# Patient Record
Sex: MALE | Race: WHITE | NOT HISPANIC OR LATINO | Employment: OTHER | URBAN - METROPOLITAN AREA
[De-identification: names, ages, dates, MRNs, and addresses within clinical notes are randomized per-mention and may not be internally consistent; named-entity substitution may affect disease eponyms.]

---

## 2021-03-02 ENCOUNTER — OFFICE VISIT (OUTPATIENT)
Dept: DERMATOLOGY | Facility: IMAGING CENTER | Age: 61
End: 2021-03-02
Payer: COMMERCIAL

## 2021-03-02 VITALS — HEIGHT: 69 IN | TEMPERATURE: 97.4 F | WEIGHT: 202 LBS | BODY MASS INDEX: 29.92 KG/M2

## 2021-03-02 DIAGNOSIS — D18.01 CHERRY ANGIOMA: ICD-10-CM

## 2021-03-02 DIAGNOSIS — L57.0 ACTINIC KERATOSES: ICD-10-CM

## 2021-03-02 DIAGNOSIS — D22.9 MULTIPLE MELANOCYTIC NEVI: ICD-10-CM

## 2021-03-02 DIAGNOSIS — L81.4 LENTIGINES: ICD-10-CM

## 2021-03-02 DIAGNOSIS — L82.1 SEBORRHEIC KERATOSES: ICD-10-CM

## 2021-03-02 PROCEDURE — 17003 DESTRUCT PREMALG LES 2-14: CPT | Performed by: DERMATOLOGY

## 2021-03-02 PROCEDURE — 17000 DESTRUCT PREMALG LESION: CPT | Performed by: DERMATOLOGY

## 2021-03-02 PROCEDURE — 99203 OFFICE O/P NEW LOW 30 MIN: CPT | Mod: 25 | Performed by: DERMATOLOGY

## 2021-03-02 NOTE — PROGRESS NOTES
DERMATOLOGY NOTE  NEW VISIT       Chief complaint: Establish Care (CAMRYN)     Establish care for full skin exam.  No new or changing lesions of concern. History of sun exposure - played golf in college and rode motorcycle for years. Denies new, growing, changing, itching or bleeding skin lesions today.    History of skin cancer: No  History of precancers/actinic keratoses: No  History of biopsies:Yes, Details: benign  History of blistering/severe sunburns:No  Family history of skin cancer:No  Family history of atypical moles:No    Past Medical History:   Diagnosis Date   • Prostate cancer (HCC)         History reviewed. No pertinent family history.     Social History     Socioeconomic History   • Marital status:      Spouse name: Not on file   • Number of children: Not on file   • Years of education: Not on file   • Highest education level: Not on file   Occupational History   • Not on file   Tobacco Use   • Smoking status: Never Smoker   • Smokeless tobacco: Never Used   Substance and Sexual Activity   • Alcohol use: Not on file   • Drug use: Not on file   • Sexual activity: Not on file   Other Topics Concern   • Not on file   Social History Narrative   • Not on file     Social Determinants of Health     Financial Resource Strain:    • Difficulty of Paying Living Expenses:    Food Insecurity:    • Worried About Running Out of Food in the Last Year:    • Ran Out of Food in the Last Year:    Transportation Needs:    • Lack of Transportation (Medical):    • Lack of Transportation (Non-Medical):    Physical Activity:    • Days of Exercise per Week:    • Minutes of Exercise per Session:    Stress:    • Feeling of Stress :    Social Connections:    • Frequency of Communication with Friends and Family:    • Frequency of Social Gatherings with Friends and Family:    • Attends Yazidi Services:    • Active Member of Clubs or Organizations:    • Attends Club or Organization Meetings:    • Marital Status:    Intimate  "Partner Violence:    • Fear of Current or Ex-Partner:    • Emotionally Abused:    • Physically Abused:    • Sexually Abused:         No Known Allergies     MEDICATIONS:  Medications relevant to specialty reviewed.  No current outpatient medications on file.     REVIEW OF SYSTEMS:   Positive for skin (see HPI)  Negative for fevers, chills and cough       EXAM:  Temp 36.3 °C (97.4 °F)   Ht 1.753 m (5' 9\")   Wt 91.6 kg (202 lb)   BMI 29.83 kg/m²   Constitutional: Well-developed, well-nourished, and in no distress.     A total body skin exam was performed excluding the genitals per patient preference and including the following areas: head (including face), neck, chest, abdomen, groin/buttocks, back, bilateral upper extremities, and bilateral lower extremities with the following pertinent findings listed below and/or in assessment/plan.   Exam in plan     IMPRESSION / PLAN:    1. Actinic keratoses  Exam: left temple, left helix and right cheek with pink gritty papules  - Discussed precancerous nature of the condition, relationship to ultraviolet light exposure and need for safe sun practices and daily broad spectrum sun protection.   - Discussed treatment options including topical therapies, cryotherapy and photodynamic therapy.   - After discussing treatment options, patient wishes to pursue cryotherapy. Patient was advised that if actinic keratoses persist at one month after treatment, should come in for further evaluation and possible biopsy.     Procedure Note: Informed verbal consent including risk of redness, swelling, pain, blistering, scar, bleeding, infection and need for possible further treatment was obtained. The location(s) was/were identified by the patient and the physician. The lesion(s) was/were treated with cryotherapy for a total of 1 freeze/thaw cycles with the open spray technique. The patient tolerated the procedure well.  Wound care was discussed.   Location: as noted in exam  Total # of " "lesions treated:  3    2. Multiple melanocytic nevi  Exam: trunk and extremities with scattered medium brown uniform macules all of which were morphologically similar and with benign-appearing pigment network patterns on dermoscopy   -Reviewed moles and melanoma. Patient advised to return sooner than scheduled if concerning changes in moles are noted.  -Reviewed sun protective behavior including sunscreen application and reapplication, appropriate choice of SPF, hat, protective clothing, seeking shade and never choosing to \"lie out\" in the sun.    3. Lentigines  Exam: sun exposed skin of trunk and b/l upper and lower extremities with scattered clinically benign light brown reticulated macules all of which were morphologically similar and none of which were suspicious for skin cancer today   - Discussed benign nature of lesions, related to sun exposure  - Discussed sun protection    4. Seborrheic keratoses  Exam: neck, trunk and extremities with scattered tan-brown, waxy, hyperkeratotic papules   -nature of the diagnosis was discussed in detail  -clinically benign today on exam, reassurance was given  -continue to monitor for any changes, pt to call if any changes occur    5. Cherry angioma  Exam: trunk and extremities with cherry red macules and papules  -nature of the diagnosis was discussed in detail  -clinically benign today on exam, reassurance was given  -continue to monitor for any changes, pt to call if any changes occur    Return to clinic in: Return in about 1 year (around 3/2/2022) for CAMRYN. and as needed for any new or changing skin lesions.    Keily Duenas M.D.    "

## 2021-03-15 DIAGNOSIS — Z23 NEED FOR VACCINATION: ICD-10-CM

## 2021-03-19 ENCOUNTER — IMMUNIZATION (OUTPATIENT)
Dept: FAMILY PLANNING/WOMEN'S HEALTH CLINIC | Facility: IMMUNIZATION CENTER | Age: 61
End: 2021-03-19
Attending: INTERNAL MEDICINE
Payer: COMMERCIAL

## 2021-03-19 DIAGNOSIS — Z23 NEED FOR VACCINATION: ICD-10-CM

## 2021-03-19 DIAGNOSIS — Z23 ENCOUNTER FOR VACCINATION: Primary | ICD-10-CM

## 2021-03-19 PROCEDURE — 91301 MODERNA SARS-COV-2 VACCINE: CPT

## 2021-03-19 PROCEDURE — 0011A MODERNA SARS-COV-2 VACCINE: CPT

## 2021-03-23 ENCOUNTER — OFFICE VISIT (OUTPATIENT)
Dept: MEDICAL GROUP | Facility: IMAGING CENTER | Age: 61
End: 2021-03-23
Payer: COMMERCIAL

## 2021-03-23 VITALS
WEIGHT: 213 LBS | BODY MASS INDEX: 31.55 KG/M2 | RESPIRATION RATE: 12 BRPM | HEART RATE: 52 BPM | DIASTOLIC BLOOD PRESSURE: 68 MMHG | HEIGHT: 69 IN | OXYGEN SATURATION: 96 % | SYSTOLIC BLOOD PRESSURE: 120 MMHG | TEMPERATURE: 97.9 F

## 2021-03-23 DIAGNOSIS — M54.41 ACUTE BILATERAL LOW BACK PAIN WITH RIGHT-SIDED SCIATICA: ICD-10-CM

## 2021-03-23 DIAGNOSIS — Z85.46 HISTORY OF PROSTATE CANCER: ICD-10-CM

## 2021-03-23 DIAGNOSIS — N52.31 ERECTILE DYSFUNCTION AFTER RADICAL PROSTATECTOMY: ICD-10-CM

## 2021-03-23 DIAGNOSIS — Z13.0 SCREENING FOR DEFICIENCY ANEMIA: ICD-10-CM

## 2021-03-23 DIAGNOSIS — Z11.59 ENCOUNTER FOR HEPATITIS C SCREENING TEST FOR LOW RISK PATIENT: ICD-10-CM

## 2021-03-23 DIAGNOSIS — Z13.1 SCREENING FOR DIABETES MELLITUS: ICD-10-CM

## 2021-03-23 DIAGNOSIS — Z13.220 SCREENING FOR CHOLESTEROL LEVEL: ICD-10-CM

## 2021-03-23 DIAGNOSIS — B00.2 ORAL HERPES: ICD-10-CM

## 2021-03-23 DIAGNOSIS — Z12.11 SCREENING FOR COLON CANCER: ICD-10-CM

## 2021-03-23 DIAGNOSIS — L24.0 IRRITANT CONTACT DERMATITIS DUE TO DETERGENT: ICD-10-CM

## 2021-03-23 PROBLEM — N52.39 POST-PROCEDURAL ERECTILE DYSFUNCTION: Status: ACTIVE | Noted: 2021-03-23

## 2021-03-23 PROCEDURE — 99204 OFFICE O/P NEW MOD 45 MIN: CPT | Performed by: FAMILY MEDICINE

## 2021-03-23 RX ORDER — TRIAMCINOLONE ACETONIDE 1 MG/G
1 CREAM TOPICAL 2 TIMES DAILY
Qty: 28.4 G | Refills: 3 | Status: SHIPPED | OUTPATIENT
Start: 2021-03-23 | End: 2022-08-15 | Stop reason: SDUPTHER

## 2021-03-23 RX ORDER — SILDENAFIL CITRATE 20 MG/1
TABLET ORAL
COMMUNITY
Start: 2019-12-19 | End: 2021-03-23 | Stop reason: SDUPTHER

## 2021-03-23 RX ORDER — ACYCLOVIR 400 MG/1
TABLET ORAL
COMMUNITY
Start: 2019-03-08 | End: 2021-03-23 | Stop reason: SDUPTHER

## 2021-03-23 RX ORDER — ACYCLOVIR 400 MG/1
TABLET ORAL
Qty: 45 TABLET | Refills: 1 | Status: SHIPPED | OUTPATIENT
Start: 2021-03-23 | End: 2022-08-15 | Stop reason: SDUPTHER

## 2021-03-23 RX ORDER — SILDENAFIL CITRATE 20 MG/1
TABLET ORAL
Qty: 20 TABLET | Refills: 3 | Status: SHIPPED | OUTPATIENT
Start: 2021-03-23 | End: 2022-08-15 | Stop reason: SDUPTHER

## 2021-03-23 RX ORDER — TRIAMCINOLONE ACETONIDE 1 MG/G
CREAM TOPICAL 2 TIMES DAILY
COMMUNITY
End: 2021-03-23 | Stop reason: SDUPTHER

## 2021-03-23 ASSESSMENT — ENCOUNTER SYMPTOMS
NAUSEA: 0
COUGH: 0
ABDOMINAL PAIN: 0
DOUBLE VISION: 0
HEADACHES: 0
WHEEZING: 0
DIZZINESS: 0
FEVER: 0
PALPITATIONS: 0
BACK PAIN: 1
SHORTNESS OF BREATH: 0
CHILLS: 0
DIARRHEA: 0
VOMITING: 0
EYE PAIN: 0
MYALGIAS: 0

## 2021-03-23 ASSESSMENT — PAIN SCALES - GENERAL: PAINLEVEL: NO PAIN

## 2021-03-23 ASSESSMENT — PATIENT HEALTH QUESTIONNAIRE - PHQ9: CLINICAL INTERPRETATION OF PHQ2 SCORE: 0

## 2021-03-23 NOTE — PROGRESS NOTES
"Chief Complaint   Patient presents with   • Establish Care   • Back Pain     low back, into right leg, x 3 months     60-year-old male with a history of prostate cancer here with low back pain. Play golf and has been playing palliatively. Never had back pain before. Stretching and when he doesn't play golf it proves mostly resolved.More on the right low back does go down the right leg though does get the left side as well.   No fevers, chills, no weight loss, no numbness in rectal area, no bowel or urinary incontinence.   Allergy to oil of palm. So uses kenalog cream. Though not often.  Uses sildenafil for ED.  Allergies   Allergen Reactions   • Other Misc Rash     Allergic to oil of palm     Current Outpatient Medications   Medication Sig Dispense Refill   • sildenafil (REVATIO) 20 MG tablet Take one to three tabs orally one hour prior to sexual activity. Do not exceed 3 tabs in 24 hours. 20 tablet 3   • acyclovir (ZOVIRAX) 400 MG tablet Take 1 tablet by mouth 3 times a day for 5 days 45 tablet 1   • triamcinolone acetonide (KENALOG) 0.1 % Cream Apply 1 Application topically 2 times a day. 28.4 g 3     No current facility-administered medications for this visit.     Social History     Tobacco Use   • Smoking status: Never Smoker   • Smokeless tobacco: Never Used   Substance Use Topics   • Alcohol use: Not Currently   • Drug use: Not Currently     History reviewed. No pertinent family history.    /68   Pulse (!) 52   Temp 36.6 °C (97.9 °F)   Resp 12   Ht 1.753 m (5' 9\")   Wt 96.6 kg (213 lb)   SpO2 96%  Body mass index is 31.45 kg/m².  Review of Systems   Constitutional: Negative for chills, fever and malaise/fatigue.   HENT: Negative for hearing loss and tinnitus.    Eyes: Negative for double vision and pain.   Respiratory: Negative for cough, shortness of breath and wheezing.    Cardiovascular: Negative for chest pain, palpitations and leg swelling.   Gastrointestinal: Negative for abdominal pain, " diarrhea, nausea and vomiting.   Genitourinary: Negative for dysuria and frequency.   Musculoskeletal: Positive for back pain. Negative for joint pain and myalgias.   Skin: Negative for itching and rash.   Neurological: Negative for dizziness and headaches.     Physical Exam   Constitutional: He is well-developed, well-nourished, and in no distress. No distress.   HENT:   Head: Normocephalic and atraumatic.   Eyes: Pupils are equal, round, and reactive to light. Conjunctivae and EOM are normal. Right eye exhibits no discharge. Left eye exhibits no discharge. No scleral icterus.   Neck: No thyromegaly present.   Pulmonary/Chest: Effort normal. No respiratory distress.   Abdominal: He exhibits no distension.   Musculoskeletal:         General: No edema.      Comments: No spinal tenderness no point tenderness of the muscles.  Full range of motion of the spine.    Neurological: He is alert.   Skin: Skin is warm and dry. He is not diaphoretic.   Psychiatric: Affect and judgment normal.         All labs (last 1 month):   No visits with results within 1 Month(s) from this visit.   Latest known visit with results is:   No results found for any previous visit.       Lipids: No results found for: CHOLSTRLTOT, TRIGLYCERIDE, HDL, LDL    Imaging: No results found.    A/P    Return for annual.    Problem List Items Addressed This Visit     Post-procedural erectile dysfunction    Relevant Medications    sildenafil (REVATIO) 20 MG tablet      Other Visit Diagnoses     History of prostate cancer        Relevant Orders    DX-LUMBAR SPINE-2 OR 3 VIEWS    Screening for colon cancer        Relevant Orders    REFERRAL TO GI FOR COLONOSCOPY    Screening for deficiency anemia        Relevant Orders    CBC WITH DIFFERENTIAL    Screening for diabetes mellitus        Relevant Orders    Comp Metabolic Panel    Screening for cholesterol level        Relevant Orders    Lipid Profile    Encounter for hepatitis C screening test for low risk  patient        Relevant Orders    HEP C VIRUS ANTIBODY    Oral herpes        Relevant Medications    acyclovir (ZOVIRAX) 400 MG tablet    Irritant contact dermatitis due to detergent        Relevant Medications    triamcinolone acetonide (KENALOG) 0.1 % Cream    Acute bilateral low back pain with right-sided sciatica        Relevant Orders    REFERRAL TO PHYSICAL THERAPY    DX-LUMBAR SPINE-2 OR 3 VIEWS          Portions of this note may be dictated using Dragon NaturallySpeaRiskonnect voice recognition software.  Variances in spelling and vocabulary are possible and unintentional.  Not all areas may be caught/corrected.  Please notify me if any discrepancies are noted or if the meaning of any statement is not correct/clear.

## 2021-04-06 ENCOUNTER — HOSPITAL ENCOUNTER (OUTPATIENT)
Dept: RADIOLOGY | Facility: MEDICAL CENTER | Age: 61
End: 2021-04-06
Attending: FAMILY MEDICINE
Payer: COMMERCIAL

## 2021-04-06 DIAGNOSIS — Z85.46 HISTORY OF PROSTATE CANCER: ICD-10-CM

## 2021-04-06 DIAGNOSIS — M54.41 ACUTE BILATERAL LOW BACK PAIN WITH RIGHT-SIDED SCIATICA: ICD-10-CM

## 2021-04-06 PROCEDURE — 72100 X-RAY EXAM L-S SPINE 2/3 VWS: CPT

## 2021-04-09 ENCOUNTER — HOSPITAL ENCOUNTER (OUTPATIENT)
Dept: LAB | Facility: MEDICAL CENTER | Age: 61
End: 2021-04-09
Attending: FAMILY MEDICINE
Payer: COMMERCIAL

## 2021-04-09 DIAGNOSIS — Z11.59 ENCOUNTER FOR HEPATITIS C SCREENING TEST FOR LOW RISK PATIENT: ICD-10-CM

## 2021-04-09 DIAGNOSIS — Z13.220 SCREENING FOR CHOLESTEROL LEVEL: ICD-10-CM

## 2021-04-09 DIAGNOSIS — Z13.1 SCREENING FOR DIABETES MELLITUS: ICD-10-CM

## 2021-04-09 DIAGNOSIS — Z13.0 SCREENING FOR DEFICIENCY ANEMIA: ICD-10-CM

## 2021-04-09 LAB
ALBUMIN SERPL BCP-MCNC: 4.1 G/DL (ref 3.2–4.9)
ALBUMIN/GLOB SERPL: 1.4 G/DL
ALP SERPL-CCNC: 86 U/L (ref 30–99)
ALT SERPL-CCNC: 21 U/L (ref 2–50)
ANION GAP SERPL CALC-SCNC: 4 MMOL/L (ref 7–16)
AST SERPL-CCNC: 24 U/L (ref 12–45)
BASOPHILS # BLD AUTO: 0.5 % (ref 0–1.8)
BASOPHILS # BLD: 0.03 K/UL (ref 0–0.12)
BILIRUB SERPL-MCNC: 0.8 MG/DL (ref 0.1–1.5)
BUN SERPL-MCNC: 20 MG/DL (ref 8–22)
CALCIUM SERPL-MCNC: 9.4 MG/DL (ref 8.5–10.5)
CHLORIDE SERPL-SCNC: 106 MMOL/L (ref 96–112)
CHOLEST SERPL-MCNC: 189 MG/DL (ref 100–199)
CO2 SERPL-SCNC: 29 MMOL/L (ref 20–33)
CREAT SERPL-MCNC: 0.97 MG/DL (ref 0.5–1.4)
EOSINOPHIL # BLD AUTO: 0.11 K/UL (ref 0–0.51)
EOSINOPHIL NFR BLD: 1.9 % (ref 0–6.9)
ERYTHROCYTE [DISTWIDTH] IN BLOOD BY AUTOMATED COUNT: 45.6 FL (ref 35.9–50)
FASTING STATUS PATIENT QL REPORTED: NORMAL
GLOBULIN SER CALC-MCNC: 3 G/DL (ref 1.9–3.5)
GLUCOSE SERPL-MCNC: 100 MG/DL (ref 65–99)
HCT VFR BLD AUTO: 46.7 % (ref 42–52)
HCV AB SER QL: NORMAL
HDLC SERPL-MCNC: 40 MG/DL
HGB BLD-MCNC: 15.9 G/DL (ref 14–18)
IMM GRANULOCYTES # BLD AUTO: 0.01 K/UL (ref 0–0.11)
IMM GRANULOCYTES NFR BLD AUTO: 0.2 % (ref 0–0.9)
LDLC SERPL CALC-MCNC: 134 MG/DL
LYMPHOCYTES # BLD AUTO: 2.59 K/UL (ref 1–4.8)
LYMPHOCYTES NFR BLD: 43.7 % (ref 22–41)
MCH RBC QN AUTO: 32.3 PG (ref 27–33)
MCHC RBC AUTO-ENTMCNC: 34 G/DL (ref 33.7–35.3)
MCV RBC AUTO: 94.7 FL (ref 81.4–97.8)
MONOCYTES # BLD AUTO: 0.61 K/UL (ref 0–0.85)
MONOCYTES NFR BLD AUTO: 10.3 % (ref 0–13.4)
NEUTROPHILS # BLD AUTO: 2.58 K/UL (ref 1.82–7.42)
NEUTROPHILS NFR BLD: 43.4 % (ref 44–72)
NRBC # BLD AUTO: 0 K/UL
NRBC BLD-RTO: 0 /100 WBC
PLATELET # BLD AUTO: 272 K/UL (ref 164–446)
PMV BLD AUTO: 10.4 FL (ref 9–12.9)
POTASSIUM SERPL-SCNC: 4.8 MMOL/L (ref 3.6–5.5)
PROT SERPL-MCNC: 7.1 G/DL (ref 6–8.2)
RBC # BLD AUTO: 4.93 M/UL (ref 4.7–6.1)
SODIUM SERPL-SCNC: 139 MMOL/L (ref 135–145)
TRIGL SERPL-MCNC: 77 MG/DL (ref 0–149)
WBC # BLD AUTO: 5.9 K/UL (ref 4.8–10.8)

## 2021-04-09 PROCEDURE — 80053 COMPREHEN METABOLIC PANEL: CPT

## 2021-04-09 PROCEDURE — 36415 COLL VENOUS BLD VENIPUNCTURE: CPT

## 2021-04-09 PROCEDURE — 85025 COMPLETE CBC W/AUTO DIFF WBC: CPT

## 2021-04-09 PROCEDURE — 80061 LIPID PANEL: CPT

## 2021-04-09 PROCEDURE — 86803 HEPATITIS C AB TEST: CPT

## 2021-04-15 ENCOUNTER — PHYSICAL THERAPY (OUTPATIENT)
Dept: PHYSICAL THERAPY | Facility: MEDICAL CENTER | Age: 61
End: 2021-04-15
Attending: FAMILY MEDICINE
Payer: COMMERCIAL

## 2021-04-15 DIAGNOSIS — M54.41 ACUTE BILATERAL LOW BACK PAIN WITH RIGHT-SIDED SCIATICA: ICD-10-CM

## 2021-04-15 PROCEDURE — 97162 PT EVAL MOD COMPLEX 30 MIN: CPT

## 2021-04-15 PROCEDURE — 97110 THERAPEUTIC EXERCISES: CPT

## 2021-04-15 ASSESSMENT — ENCOUNTER SYMPTOMS
PAIN TIMING: OCCASIONAL
PAIN SCALE AT LOWEST: 0
PAIN SCALE: 0
PAIN SCALE AT HIGHEST: 6

## 2021-04-15 NOTE — OP THERAPY EVALUATION
"  Outpatient Physical Therapy  INITIAL EVALUATION    [facility] Outpatient Physical Therapy  [address]  [place] [address]  Phone:  [phone]  Fax:  [phone]    Date of Evaluation: 04/15/2021    Patient: Dipak Johnson  YOB: 1960  MRN: [id number]     Referring Provider: [place] [address] [address]   Referring Diagnosis Acute bilateral low back pain with right-sided sciatica [M54.41]     Time Calculation    Start time: 1311  Stop time: 1412 Time Calculation (min): 61 minutes         Chief Complaint: Back Problem    Visit Diagnoses     ICD-10-CM   1. Acute bilateral low back pain with right-sided sciatica  M54.41       No data found  Subjective:   History of Present Illness:     Mechanism of injury:  Patient is a 60 year old male with a PMH including: Appendectomy, carpal tunnel release, hernia repair, Prostate cancer. Surgical history including: Removal of prostate (9 years ago.  Pt reports still does [facility] for prostate cancer.    Pt presents to therapy with complaints of intermittent \"irritating\" R sided LBP with radiation into RLE which initiated 2 months ago after golfing. Pain extending to ankle. Denies numbness/tingling in LE's. Increase pain with valsalva. Denies previous history of back pain without injuries or trauma. Currently denies changes in bowel and bladder, saddle anesthesia, significant weight changes, chills/night sweats, nausea and vomiting, and unexplained fatigue.  Pt consents to evaluation and treatment today.            Prior level of function:  Professional golfer; retired   Pain:     Current pain ratin    At best pain ratin    At worst pain ratin    Location:  R sided LBP and occasional pain into leg to ankle    Pain timing:  Occasional    Progression:  Stable    Pain Comments::  Aggravating: Bending, twisting, golfing -rotating to L, prolonged walking>1 mile on level ground and less on " unlevel, lying and bring knee to chest, driving 2-3 hours, rotation to L     Relieving: Walking    Symptom duration: Depends  Diagnostic Tests:     Diagnostic Tests Comments:  Lumbar x ray 4/6/21:     FINDINGS:  Vertebral body height is well maintained.  There is no evidence of fracture.  Vertebral alignment is normal.  There is mild to moderate degenerative disc disease and facet arthropathy which is most prominent in the lower lumbar spine.        IMPRESSION:     No compression deformity or acute fracture is identified.  FINDINGS ARE CONSISTENT WITH MILD TO MODERATE DEGENERATIVE DISC DISEASE AND FACET ARTHROPATHY.    Activities of Daily Living:     Patient reported ADL status: Patient's current daily routine includes:  Work: Retired professional golfer  Hobbies: Golfing, hiking  Exercise: Biking, lifting weights (cautious due to fear of back pain)    Indep with ADL's but pain with pain with certain activities.          Patient Goals:     Patient goals for therapy:  Decreased pain    Other patient goals:  Return to golfing pain-free, less pain with driving (assisted pt with goal making)      Past Medical History:   Diagnosis Date   • Prostate cancer (HCC)      Past Surgical History:   Procedure Laterality Date   • APPENDECTOMY     • CARPAL TUNNEL RELEASE Left    • HERNIA REPAIR       Social History     Tobacco Use   • Smoking status: Never Smoker   • Smokeless tobacco: Never Used   Substance Use Topics   • Alcohol use: Not Currently     Family and Occupational History     Socioeconomic History   • Marital status:      Spouse name: Not on file   • Number of children: Not on file   • Years of education: Not on file   • Highest education level: Not on file   Occupational History   • Not on file       Objective     Postural Observations  Seated posture: poor    Additional Postural Observation Details  Forward head and rounded shoulders     Hip Screen   Hip range of motion within functional limits with the  following exceptions: HS length WFL bilaterally  Limited R hip IR (assessed supine 90/90)    Neurological Testing     Reflexes   Left   Patellar (L4): normal (2+)  Achilles (S1): normal (2+)    Right   Patellar (L4): normal (2+)  Achilles (S1): trace (1+)    Myotome testing   Lumbar (left)   All left lumbar myotomes within normal limits    Lumbar (right)   All right lumbar myotomes within normal limits    Dermatome testing   Lumbar (left)   All left lumbar dermatomes intact    Lumbar (right)   All right lumbar dermatomes intact    Palpation   Left   No palpable tenderness to the lumbar paraspinals.     Right   No palpable tenderness to the lumbar paraspinals.     Tenderness     Additional Tenderness Details  No tenderness at R glute, piriformis, no pain with OP to sciatic nerve    Active Range of Motion     Lumbar   Flexion: Lumbar active flexion: FT to toes; tightness R hamstring.  Extension: decreased (R LBP)  Left rotation: decreased (R LBP)  Right rotation: within functional limits    Additional Active Range of Motion Details  0/10 baseline pain in standing     Side glide to R; R LBP     Strength:      Additional Strength Details  Supine LE extension: 4-/5     Tests       Lumbar spine (left)      Negative slump.   Lumbar spine (right)     Negative slump.     Left Hip   SLR: Negative.     Right Hip   SLR: Negative.     General Comments     Spine Comments   Prone extensions:   Prone extensions 2x10//better; increased l/s extension AROM after second set; some decrease pain in R LB with repeat golf swing testing    *Concordant sign: Golf swing to L         Therapeutic Exercises (CPT 04719):     17. UPOC: 6/11/21 (eval date: 4/15/21)      Therapeutic Exercise Summary: Access Code: [email or web address]  URL: [email or web address]  Date: 04/15/2021  Prepared by: Eliezer Avina    Exercises  Correct Seated Posture - 7 x weekly  Supine Figure 4 Piriformis Stretch - 2 x daily - 7 x weekly - 2 sets - 30 sec hold  Prone  "Press Up - 5-6 x daily - 7 x weekly - 1 sets - 10 reps      Pt performed these exercises with instruction and SPV.  Provided handout with these exercises for daily HEP.        Time-based treatments/modalities:    Physical Therapy Timed Treatment Charges  Therapeutic exercise minutes (CPT 54842): 13 minutes      Assessment, Response and Plan:   Assessment details:  Patient is a pleasant and cooperative 60-year-old male who presents to therapy with complaints of intermittent \"irritating\" R sided LBP with radiation into RLE to ankle, which initiated 2 months ago after golfing. Imaging remarkable for: mild to moderate degenerative disc disease and facet arthropathy. Symptoms are stable. No red flags.    Exam findings suggestive of posterior chain and core weakness, R hip IR ROM limitation, l/s extension limitations resulting in impacted ADL's. Additional impairments as above. Some evidence that pt may be an extension responder per above, given hep as trial for symptom response. Pt may benefit from skilled physical therapy in order to address above impairments in order to improve QOL and return to reported ADL's.     Prognosis: good    Goals:   Short Term Goals:   1. Pt will be independent with written HEP.  2. Pt will report 25% or greater decreased frequency of LE symptoms.  3. Pt will be able to progress 2/3 of hep.  Short term goal time span:  2-4 weeks      Long Term Goals:    1. Pt will be independent with written HEP.  2. Pt will have an RMQ score of 0 (eval: 4.17 )  3. Pt will be able to demonstrate 10 golf swings consistently without reproduction of LE s/s in order to improve QOL.  4. Pt will be able to walk for unlimited periods of time on level ground without onset of symptoms.  5. Pt will be able to demonstrate TrA contraction in all functional activities.  Long term goal time span:  6-8 weeks    Plan:   Therapy options:  Physical therapy treatment to continue  Planned therapy interventions:  Neuromuscular " Re-education (CPT 69828), Manual Therapy (CPT 25234), Mechanical Traction (CPT 90039), Gait Training (CPT 59963), E Stim Unattended (CPT 57790) and Therapeutic Exercise (CPT 37654)  Frequency: 1-2x/wk.  Duration in weeks:  8  Discussed with:  Patient  Plan details:  UPOC: 6/11/21          Functional Assessment Used  Joseph Paul Low Back Pain and Disability Score: 4.17     Referring provider co-signature:  I have reviewed this plan of care and my co-signature certifies the need for services.    Certification Period: 04/15/2021 to  6/11/21    Physician Signature: ________________________________ Date: ______________

## 2021-04-17 ENCOUNTER — IMMUNIZATION (OUTPATIENT)
Dept: FAMILY PLANNING/WOMEN'S HEALTH CLINIC | Facility: IMMUNIZATION CENTER | Age: 61
End: 2021-04-17
Attending: INTERNAL MEDICINE
Payer: COMMERCIAL

## 2021-04-17 DIAGNOSIS — Z23 ENCOUNTER FOR VACCINATION: Primary | ICD-10-CM

## 2021-04-17 PROCEDURE — 0012A MODERNA SARS-COV-2 VACCINE: CPT | Performed by: INTERNAL MEDICINE

## 2021-04-17 PROCEDURE — 91301 MODERNA SARS-COV-2 VACCINE: CPT | Performed by: INTERNAL MEDICINE

## 2021-04-20 ENCOUNTER — APPOINTMENT (OUTPATIENT)
Dept: PHYSICAL THERAPY | Facility: REHABILITATION | Age: 61
End: 2021-04-20
Attending: FAMILY MEDICINE
Payer: COMMERCIAL

## 2021-04-21 ENCOUNTER — PHYSICAL THERAPY (OUTPATIENT)
Dept: PHYSICAL THERAPY | Facility: MEDICAL CENTER | Age: 61
End: 2021-04-21
Attending: FAMILY MEDICINE
Payer: COMMERCIAL

## 2021-04-21 DIAGNOSIS — M54.41 ACUTE BILATERAL LOW BACK PAIN WITH RIGHT-SIDED SCIATICA: ICD-10-CM

## 2021-04-21 PROCEDURE — 97110 THERAPEUTIC EXERCISES: CPT

## 2021-04-21 PROCEDURE — 97012 MECHANICAL TRACTION THERAPY: CPT

## 2021-04-21 NOTE — OP THERAPY DAILY TREATMENT
Outpatient Physical Therapy  DAILY TREATMENT     [facility] Outpatient Physical Therapy  [address]  [place] [address]  Phone:  [phone]  Fax:  [phone]    Date: 04/21/2021    Patient: Dipak Johnson  YOB: 1960  MRN: [id number]     Time Calculation    Start time: 0915  Stop time: 1000 Time Calculation (min): 45 minutes         Chief Complaint: Back Problem    Visit #: 2    SUBJECTIVE:  I'm still feeling my back. It's a little frequent and less aching. I haven't played any golf. I feel it more at night and then in the mornings. I helped my friend move and did a lot of bending and twisting.       OBJECTIVE:  Current objective measures:       Poor postural awareness  Glut med weakness R>L    Golf swing x5: Consistent reproduction of pain at R low back/hip (pt reporting improved vs. Last session)    Manual l/s traction: No change in hooklying    Golf swing x3 post traction: No change R LBP    Therapeutic Exercises (CPT 93960):     2. Ball bridge on swissball, x2 min, hep    3. Toy soldier, 2x30 sec, VC's for straight torso without side bending; hep    4. Pt education, re: review of lumbar support, avoiding prolonged sitting, bending and twisting    17. UPOC: 6/11/21 (eval date: 4/15/21)      Therapeutic Exercise Summary: Access Code: [email or web address]  URL: [email or web address]  Date: 04/21/2021  Prepared by: Eliezer Avina    Exercises  Bridge with Arms at Sides and Feet on Swiss Ball - 2 x daily - 7 x weekly - 2-3 sets - endurance hold    Pt performed these exercises with instruction and SPV.  Provided handout with these exercises for daily HEP.        Therapeutic Treatments and Modalities:     1. Mechanical Traction (CPT 48828), [facility] traction l/s 95/45# with mhp x15 min    Time-based treatments/modalities:    Physical Therapy Timed Treatment Charges  Therapeutic exercise minutes (CPT 49097): 30 minutes      Pain rating (1-10) before treatment:  0/10  Pain  rating (1-10) after treatment:  0        ASSESSMENT:   Response to treatment:   Pt reporting improvement compared to previous session with mod carryover and compliance to initial hep. Reviewed importance of compliance to posture correction, lumbar support and avoiding prolonged sitting. Trial of [facility] lumbar traction today with no change R back and hip pain following. Trial of manual to hip next session.      PLAN/RECOMMENDATIONS:   Plan for treatment: therapy treatment to continue next visit.  Planned interventions for next visit: continue with current treatment.  Manual R hip for incr IR; assess response to new hep

## 2021-04-22 ENCOUNTER — APPOINTMENT (OUTPATIENT)
Dept: PHYSICAL THERAPY | Facility: REHABILITATION | Age: 61
End: 2021-04-22
Payer: COMMERCIAL

## 2021-04-23 ENCOUNTER — PHYSICAL THERAPY (OUTPATIENT)
Dept: PHYSICAL THERAPY | Facility: MEDICAL CENTER | Age: 61
End: 2021-04-23
Attending: FAMILY MEDICINE
Payer: COMMERCIAL

## 2021-04-23 DIAGNOSIS — M54.41 ACUTE BILATERAL LOW BACK PAIN WITH RIGHT-SIDED SCIATICA: ICD-10-CM

## 2021-04-23 PROCEDURE — 97110 THERAPEUTIC EXERCISES: CPT

## 2021-04-23 PROCEDURE — 97012 MECHANICAL TRACTION THERAPY: CPT

## 2021-04-23 NOTE — OP THERAPY DAILY TREATMENT
"  Outpatient Physical Therapy  DAILY TREATMENT     [facility] Outpatient Physical Therapy  [address]  [place] [address]  Phone:  [phone]  Fax:  [phone]    Date: 04/23/2021    Patient: Dipak Johnson  YOB: 1960  MRN: [id number]     Time Calculation    Start time: 1420  Stop time: 1502 Time Calculation (min): 42 minutes         Chief Complaint: Back Problem and Hip Problem    Visit #: 3    SUBJECTIVE:  I have a little improvement; less pain with twisting.       OBJECTIVE:  Current objective measures:       Poor postural awareness  Glut med weakness R>L    Golf swing x5: Consistent reproduction of pain at R low back/hip (pt reporting improved vs. Last session)    Manual l/s traction: No change in hooklying    Golf swing x3 post traction: No change R LBP    Therapeutic Exercises (CPT 49885):     1. Review of hep    5. Piriformis stretch, 2x30 sec, progression with increased resistance for additional stretch following manual    6. Pt education, re: continue with stretching following manual to maintain gains    17. UPOC: 6/11/21 (eval date: 4/15/21)    Therapeutic Treatments and Modalities:     1. Mechanical Traction (CPT 65398), [facility] traction l/s 100/50# with mhp x15 min, no sig difference in golf swing immediately following traction    2. Manual Therapy (CPT 64471), see below    Therapeutic Treatment and Modalities Summary: R Hip LAD in supine followed by OP to piriformis with manual hip IR gr III and IV, Then by contract-relax method for hip stretch with emphasis on IR in supine 90/90//able to attain 45 deg hip IR    Time-based treatments/modalities:       Pain rating (1-10) before treatment:  \"It's better\" with golf swing  Pain rating (1-10) after treatment:  50% decreased following repeat golf swing        ASSESSMENT:   Response to treatment:   Significant improvement in symptoms with golf swing following increase R hip IR; able to attain ROM WFL this session. " Discussed stretches for maintaining gains. Increased parameters for traction without sig improvement immediately following traction with repeat golf swing; will assess further 48 post response at next session.    PLAN/RECOMMENDATIONS:   Plan for treatment: therapy treatment to continue next visit.  Planned interventions for next visit: continue with current treatment.  Assess R hip IR; assess response to increased traction

## 2021-04-27 ENCOUNTER — APPOINTMENT (OUTPATIENT)
Dept: PHYSICAL THERAPY | Facility: REHABILITATION | Age: 61
End: 2021-04-27
Payer: COMMERCIAL

## 2021-04-28 ENCOUNTER — PHYSICAL THERAPY (OUTPATIENT)
Dept: PHYSICAL THERAPY | Facility: MEDICAL CENTER | Age: 61
End: 2021-04-28
Attending: FAMILY MEDICINE
Payer: COMMERCIAL

## 2021-04-28 DIAGNOSIS — M54.41 ACUTE BILATERAL LOW BACK PAIN WITH RIGHT-SIDED SCIATICA: ICD-10-CM

## 2021-04-28 PROCEDURE — 97110 THERAPEUTIC EXERCISES: CPT

## 2021-04-28 PROCEDURE — 97012 MECHANICAL TRACTION THERAPY: CPT

## 2021-04-29 ENCOUNTER — APPOINTMENT (OUTPATIENT)
Dept: PHYSICAL THERAPY | Facility: REHABILITATION | Age: 61
End: 2021-04-29
Payer: COMMERCIAL

## 2021-04-30 ENCOUNTER — PHYSICAL THERAPY (OUTPATIENT)
Dept: PHYSICAL THERAPY | Facility: MEDICAL CENTER | Age: 61
End: 2021-04-30
Attending: FAMILY MEDICINE
Payer: COMMERCIAL

## 2021-04-30 DIAGNOSIS — M54.41 ACUTE BILATERAL LOW BACK PAIN WITH RIGHT-SIDED SCIATICA: ICD-10-CM

## 2021-04-30 PROCEDURE — 97012 MECHANICAL TRACTION THERAPY: CPT

## 2021-04-30 NOTE — OP THERAPY DAILY TREATMENT
Outpatient Physical Therapy  DAILY TREATMENT     [facility]  [address]  [place] [address]  Phone:  [phone]  Fax:  [phone]    Date: 04/30/2021    Patient: Dipak Johnson  YOB: 1960  MRN: [id number]     Time Calculation    Start time: 1015  Stop time: 1100 Time Calculation (min): 45 minutes         Chief Complaint: back pain  Visit #: 5    SUBJECTIVE:  Pt states he is better overall but is hesitant to hit a lot of golf balls or swing his  hard. He continues to do press ups daily.     OBJECTIVE:    Quadriped left hand behind back with rotation is very limited and provokes his right L/S pain.     Therapeutic Exercises (CPT 56053):     1. Pt started on T/S extension stretching on foam roller    Therapeutic Treatments and Modalities:     1. Manual Therapy (CPT 58759), press ups with OP lower L/s, t/s rib mobilization    2. Mechanical Traction (CPT 82117), 110#/60# with mhp    Time-based treatments/modalities:    Physical Therapy Timed Treatment Charges  Manual therapy minutes (CPT 34028): 18 minutes  Therapeutic exercise minutes (CPT 70699): 12 minutes          ASSESSMENT:   Response to treatment: pt liked the increase in traction weight. He will add T/S stretching into his routine.     PLAN/RECOMMENDATIONS:   Plan for treatment: therapy treatment to continue next visit.  Planned interventions for next visit: continue with current treatment, manual therapy (CPT 83522), mechanical traction (CPT 39487) and therapeutic exercise (CPT 67418).

## 2021-05-04 ENCOUNTER — PHYSICAL THERAPY (OUTPATIENT)
Dept: PHYSICAL THERAPY | Facility: MEDICAL CENTER | Age: 61
End: 2021-05-04
Attending: FAMILY MEDICINE
Payer: COMMERCIAL

## 2021-05-04 ENCOUNTER — APPOINTMENT (OUTPATIENT)
Dept: PHYSICAL THERAPY | Facility: REHABILITATION | Age: 61
End: 2021-05-04
Payer: COMMERCIAL

## 2021-05-04 DIAGNOSIS — M54.41 ACUTE BILATERAL LOW BACK PAIN WITH RIGHT-SIDED SCIATICA: ICD-10-CM

## 2021-05-04 PROCEDURE — 97012 MECHANICAL TRACTION THERAPY: CPT

## 2021-05-04 PROCEDURE — 97140 MANUAL THERAPY 1/> REGIONS: CPT

## 2021-05-04 PROCEDURE — 97110 THERAPEUTIC EXERCISES: CPT

## 2021-05-04 NOTE — OP THERAPY DAILY TREATMENT
Outpatient Physical Therapy  DAILY TREATMENT     [facility]  [address]  [place] [address]  Phone:  [phone]  Fax:  [phone]    Date: 05/04/2021    Patient: Dipak Johnson  YOB: 1960  MRN: [id number]     Time Calculation    Start time: 1015  Stop time: 1100 Time Calculation (min): 45 minutes         Chief Complaint: lumbar pain  Visit #: 6    SUBJECTIVE:  Pt feels he has improved and is fairly functional, but he is frustrated that he can;'t golf without flaring his back up.     OBJECTIVE:        Therapeutic Exercises (CPT 49277):     1. Foam roller stretching , 10 minutes    2. 4 way planking , difficulty with side planking    3. Multifidus lateral stepping, blue tband    Therapeutic Treatments and Modalities:     1. Manual Therapy (CPT 23956), PA's to L4,5  t/s rib mobs.     2. Mechanical Traction (CPT 82826), 110#/60# 60/20 with mhp    Time-based treatments/modalities:    Physical Therapy Timed Treatment Charges  Manual therapy minutes (CPT 08050): 10 minutes  Therapeutic exercise minutes (CPT 04260): 20 minutes        ASSESSMENT:   Pt has made some improvement overall, but continues to have some pain with golf and rotational positions.     PLAN/RECOMMENDATIONS:   Plan for treatment: pt will look into getting an epidural and he's also going to consider dry needling.

## 2021-05-06 ENCOUNTER — APPOINTMENT (OUTPATIENT)
Dept: PHYSICAL THERAPY | Facility: REHABILITATION | Age: 61
End: 2021-05-06
Payer: COMMERCIAL

## 2021-05-07 ENCOUNTER — APPOINTMENT (OUTPATIENT)
Dept: PHYSICAL THERAPY | Facility: MEDICAL CENTER | Age: 61
End: 2021-05-07
Payer: COMMERCIAL

## 2021-05-11 ENCOUNTER — APPOINTMENT (OUTPATIENT)
Dept: PHYSICAL THERAPY | Facility: REHABILITATION | Age: 61
End: 2021-05-11
Payer: COMMERCIAL

## 2021-05-12 ENCOUNTER — TELEPHONE (OUTPATIENT)
Dept: PHYSICAL THERAPY | Facility: MEDICAL CENTER | Age: 61
End: 2021-05-12

## 2021-05-12 NOTE — OP THERAPY DISCHARGE SUMMARY
"  Outpatient Physical Therapy  DISCHARGE SUMMARY NOTE      [facility]  [address]  [place] [address]  Phone:  [phone]  Fax:  [phone]    Date of Visit: 05/12/2021    Patient: Dipak Johnson  YOB: 1960  MRN: [id number]     Referring Provider: No referring provider defined for this encounter.   Referring Diagnosis No admission diagnoses are documented for this encounter.         Functional Assessment Used        Your patient is being discharged from Physical Therapy with the following comments:   · Pt wishing to self DC    Comments:  Pt contacted PT office requesting to self DC and \"take another route\"     Limitations Remaining:  Pt seen for total of 6 visits with pt reporting feels improved and is functional; but frusterated as he \"cannot golf without back flaring up.\" See last daily visit 5/4/21 for further details    Recommendations:  Pt wishing to self DC. Will DC today per his request. If patient would like to return to skilled physical therapy, he will require new referral per policy.    Eliezer Avina, PT    Date: 5/12/2021       "

## 2021-05-13 ENCOUNTER — APPOINTMENT (OUTPATIENT)
Dept: PHYSICAL THERAPY | Facility: REHABILITATION | Age: 61
End: 2021-05-13
Payer: COMMERCIAL

## 2022-05-03 ENCOUNTER — OFFICE VISIT (OUTPATIENT)
Dept: DERMATOLOGY | Facility: IMAGING CENTER | Age: 62
End: 2022-05-03
Payer: COMMERCIAL

## 2022-05-03 DIAGNOSIS — L82.1 SK (SEBORRHEIC KERATOSIS): ICD-10-CM

## 2022-05-03 DIAGNOSIS — L73.8 SEBACEOUS HYPERPLASIA: ICD-10-CM

## 2022-05-03 DIAGNOSIS — D18.01 CHERRY ANGIOMA: ICD-10-CM

## 2022-05-03 DIAGNOSIS — L72.0 MILIA: ICD-10-CM

## 2022-05-03 DIAGNOSIS — L82.0 INFLAMED SEBORRHEIC KERATOSIS: ICD-10-CM

## 2022-05-03 DIAGNOSIS — L81.4 LENTIGINES: ICD-10-CM

## 2022-05-03 DIAGNOSIS — Z12.83 SKIN CANCER SCREENING: ICD-10-CM

## 2022-05-03 DIAGNOSIS — D22.9 MULTIPLE NEVI: ICD-10-CM

## 2022-05-03 PROCEDURE — 17110 DESTRUCTION B9 LES UP TO 14: CPT | Performed by: NURSE PRACTITIONER

## 2022-05-03 PROCEDURE — 99213 OFFICE O/P EST LOW 20 MIN: CPT | Mod: 25 | Performed by: NURSE PRACTITIONER

## 2022-05-03 NOTE — PROGRESS NOTES
DERMATOLOGY NOTE  FOLLOW UP VISIT       Chief complaint: Follow-Up (CAMRYN)     Patient of Dr. Desais, last seen for CAMRYN on 3/2/21      HPI/location: Back, Skin colored papule  Time present: noticed 3 weeks  Painful lesion: No  Itching lesion: Yes  Enlarging lesion: No  Anything make it better or worse? No    History of skin cancer: No  History of precancers/actinic keratoses: Yes,  tx LN2  History of biopsies:Yes, Details: benign  History of blistering/severe sunburns:No  Family history of skin cancer:No  Family history of atypical moles:No      Allergies   Allergen Reactions   • Other Misc Rash     Allergic to oil of palm        MEDICATIONS:  Medications relevant to specialty reviewed.     REVIEW OF SYSTEMS:   Positive for skin (see HPI)  Negative for fevers and chills       EXAM:  There were no vitals taken for this visit.  Constitutional: Well-developed, well-nourished, and in no distress.     A total body skin exam was performed excluding the genitals per patient preference and including the following areas: head (including face), neck, chest, abdomen, groin/buttocks, back, bilateral upper extremities, and bilateral lower extremities with the following pertinent findings listed below and/or in assessment/plan.     -sun exposed skin of trunk and b/l upper, lower extremities and face with scattered clinically benign light brown reticulated macules all of which were morphologically similar and none of which were suspicious for skin cancer today on exam    Several tan medium brown skin-colored stuck-on waxy papules scattered on the face, trunk and extremities    Multiple tan medium brown skin-colored macules papules scattered over the trunk >> extremities, All with benign-appearing pigment network patterns on dermoscopy      Several scattered 1-3mm bright red macules and thin papules on the trunk and extremities    ISK on back    Sebaceous hyperplasia to forehead    Milia to face    IMPRESSION / PLAN:    1.  Lentigines  - Benign-appearing nature of lesions discussed during exam.   - Advised to continue to monitor for any return to clinic for new or concerning changes.      2. Cherry angioma  - Benign-appearing nature of lesions discussed during exam.   - Advised to continue to monitor for any return to clinic for new or concerning changes.      3. Inflamed seborrheic keratosis  - Benign-appearing nature of lesions discussed during exam.   - due to intense pruritis, will apply LN2 as below:  CRYOTHERAPY:  Risks (including, but not limited to: skin discoloration, redness, blister, blood blister, recurrence, need for further treatment, infection, scar) and benefits of cryotherapy discussed. Patient verbally agreed to proceed with treatment. 2 cryotherapy freeze thaw cycles of 10 seconds were applied to 1 lesion on back with cryac. Patient tolerated procedure well. Aftercare instructions given--no specific care needed unless irritated during healing process, can apply Vaseline with small band-aid if needed.    - Advised to continue to monitor for any return to clinic for new or concerning changes.      4. Multiple nevi  - Benign-appearing nature of lesions discussed during exam.   - Advised to continue to monitor for any return to clinic for new or concerning changes.  - ABCDE's of melanoma discussed      5. Sebaceous hyperplasia  - Benign-appearing nature of lesions discussed during exam.   - Advised to continue to monitor for any return to clinic for new or concerning changes.      6. Milia  - Benign-appearing nature of lesions discussed during exam.   - Advised to continue to monitor for any return to clinic for new or concerning changes.      7. SK (seborrheic keratosis)  - Benign-appearing nature of lesions discussed during exam.   - Advised to continue to monitor for any return to clinic for new or concerning changes.      8. Skin cancer screening  Skin cancer education  - discussed importance of sun protective  clothing, eyewear in addition to the use of broad spectrum sunscreen with SPF 30 or greater, as well as need for reapplication ~every 2 hours when exposed to UVR  - discussed importance following up for any new or changing lesions as noted in handout given, but every 12 months exams in clinic in the setting of dermatologic history  - ABCDE's of melanoma discussed/handout given           Please note that this dictation was created using voice recognition software. I have made every reasonable attempt to correct obvious errors, but I expect that there are errors of grammar and possibly content that I did not discover before finalizing the note.      Return to clinic in: Return in about 1 year (around 5/3/2023) for CAMRYN. and as needed for any new or changing skin lesions.

## 2022-08-12 SDOH — HEALTH STABILITY: PHYSICAL HEALTH: ON AVERAGE, HOW MANY DAYS PER WEEK DO YOU ENGAGE IN MODERATE TO STRENUOUS EXERCISE (LIKE A BRISK WALK)?: 6 DAYS

## 2022-08-12 SDOH — HEALTH STABILITY: MENTAL HEALTH
STRESS IS WHEN SOMEONE FEELS TENSE, NERVOUS, ANXIOUS, OR CAN'T SLEEP AT NIGHT BECAUSE THEIR MIND IS TROUBLED. HOW STRESSED ARE YOU?: NOT AT ALL

## 2022-08-12 SDOH — ECONOMIC STABILITY: HOUSING INSECURITY
IN THE LAST 12 MONTHS, WAS THERE A TIME WHEN YOU DID NOT HAVE A STEADY PLACE TO SLEEP OR SLEPT IN A SHELTER (INCLUDING NOW)?: PATIENT REFUSED

## 2022-08-12 SDOH — ECONOMIC STABILITY: INCOME INSECURITY: IN THE LAST 12 MONTHS, WAS THERE A TIME WHEN YOU WERE NOT ABLE TO PAY THE MORTGAGE OR RENT ON TIME?: PATIENT REFUSED

## 2022-08-12 SDOH — ECONOMIC STABILITY: TRANSPORTATION INSECURITY
IN THE PAST 12 MONTHS, HAS LACK OF RELIABLE TRANSPORTATION KEPT YOU FROM MEDICAL APPOINTMENTS, MEETINGS, WORK OR FROM GETTING THINGS NEEDED FOR DAILY LIVING?: PATIENT DECLINED

## 2022-08-12 SDOH — ECONOMIC STABILITY: HOUSING INSECURITY
IN THE LAST 12 MONTHS, WAS THERE A TIME WHEN YOU DID NOT HAVE A STEADY PLACE TO SLEEP OR SLEPT IN A SHELTER (INCLUDING NOW)?: NO

## 2022-08-12 SDOH — HEALTH STABILITY: PHYSICAL HEALTH: ON AVERAGE, HOW MANY MINUTES DO YOU ENGAGE IN EXERCISE AT THIS LEVEL?: 60 MIN

## 2022-08-12 SDOH — ECONOMIC STABILITY: TRANSPORTATION INSECURITY
IN THE PAST 12 MONTHS, HAS THE LACK OF TRANSPORTATION KEPT YOU FROM MEDICAL APPOINTMENTS OR FROM GETTING MEDICATIONS?: NO

## 2022-08-12 SDOH — ECONOMIC STABILITY: INCOME INSECURITY: HOW HARD IS IT FOR YOU TO PAY FOR THE VERY BASICS LIKE FOOD, HOUSING, MEDICAL CARE, AND HEATING?: NOT HARD AT ALL

## 2022-08-12 SDOH — ECONOMIC STABILITY: TRANSPORTATION INSECURITY
IN THE PAST 12 MONTHS, HAS LACK OF TRANSPORTATION KEPT YOU FROM MEETINGS, WORK, OR FROM GETTING THINGS NEEDED FOR DAILY LIVING?: PATIENT DECLINED

## 2022-08-12 SDOH — ECONOMIC STABILITY: HOUSING INSECURITY

## 2022-08-12 SDOH — ECONOMIC STABILITY: FOOD INSECURITY: WITHIN THE PAST 12 MONTHS, YOU WORRIED THAT YOUR FOOD WOULD RUN OUT BEFORE YOU GOT MONEY TO BUY MORE.: PATIENT DECLINED

## 2022-08-12 SDOH — ECONOMIC STABILITY: FOOD INSECURITY: WITHIN THE PAST 12 MONTHS, THE FOOD YOU BOUGHT JUST DIDN'T LAST AND YOU DIDN'T HAVE MONEY TO GET MORE.: PATIENT DECLINED

## 2022-08-12 ASSESSMENT — SOCIAL DETERMINANTS OF HEALTH (SDOH)
DO YOU BELONG TO ANY CLUBS OR ORGANIZATIONS SUCH AS CHURCH GROUPS UNIONS, FRATERNAL OR ATHLETIC GROUPS, OR SCHOOL GROUPS?: YES
WITHIN THE PAST 12 MONTHS, YOU WORRIED THAT YOUR FOOD WOULD RUN OUT BEFORE YOU GOT THE MONEY TO BUY MORE: PATIENT DECLINED
HOW OFTEN DO YOU ATTENT MEETINGS OF THE CLUB OR ORGANIZATION YOU BELONG TO?: 1 TO 4 TIMES PER YEAR
HOW OFTEN DO YOU GET TOGETHER WITH FRIENDS OR RELATIVES?: TWICE A WEEK
HOW OFTEN DO YOU GET TOGETHER WITH FRIENDS OR RELATIVES?: TWICE A WEEK
IN A TYPICAL WEEK, HOW MANY TIMES DO YOU TALK ON THE PHONE WITH FAMILY, FRIENDS, OR NEIGHBORS?: MORE THAN THREE TIMES A WEEK
HOW OFTEN DO YOU ATTEND CHURCH OR RELIGIOUS SERVICES?: PATIENT DECLINED
HOW OFTEN DO YOU HAVE A DRINK CONTAINING ALCOHOL: NEVER
IN A TYPICAL WEEK, HOW MANY TIMES DO YOU TALK ON THE PHONE WITH FAMILY, FRIENDS, OR NEIGHBORS?: MORE THAN THREE TIMES A WEEK
HOW HARD IS IT FOR YOU TO PAY FOR THE VERY BASICS LIKE FOOD, HOUSING, MEDICAL CARE, AND HEATING?: NOT HARD AT ALL
DO YOU BELONG TO ANY CLUBS OR ORGANIZATIONS SUCH AS CHURCH GROUPS UNIONS, FRATERNAL OR ATHLETIC GROUPS, OR SCHOOL GROUPS?: YES
HOW MANY DRINKS CONTAINING ALCOHOL DO YOU HAVE ON A TYPICAL DAY WHEN YOU ARE DRINKING: PATIENT DOES NOT DRINK
HOW OFTEN DO YOU ATTENT MEETINGS OF THE CLUB OR ORGANIZATION YOU BELONG TO?: 1 TO 4 TIMES PER YEAR
HOW OFTEN DO YOU ATTEND CHURCH OR RELIGIOUS SERVICES?: PATIENT DECLINED
HOW OFTEN DO YOU HAVE SIX OR MORE DRINKS ON ONE OCCASION: NEVER

## 2022-08-12 ASSESSMENT — LIFESTYLE VARIABLES
HOW MANY STANDARD DRINKS CONTAINING ALCOHOL DO YOU HAVE ON A TYPICAL DAY: PATIENT DOES NOT DRINK
HOW OFTEN DO YOU HAVE SIX OR MORE DRINKS ON ONE OCCASION: NEVER
SKIP TO QUESTIONS 9-10: 1
HOW OFTEN DO YOU HAVE A DRINK CONTAINING ALCOHOL: NEVER
AUDIT-C TOTAL SCORE: 0

## 2022-08-15 ENCOUNTER — OFFICE VISIT (OUTPATIENT)
Dept: MEDICAL GROUP | Facility: IMAGING CENTER | Age: 62
End: 2022-08-15
Payer: COMMERCIAL

## 2022-08-15 VITALS
DIASTOLIC BLOOD PRESSURE: 74 MMHG | RESPIRATION RATE: 14 BRPM | OXYGEN SATURATION: 94 % | SYSTOLIC BLOOD PRESSURE: 120 MMHG | TEMPERATURE: 98.3 F | WEIGHT: 217.4 LBS | HEART RATE: 56 BPM | HEIGHT: 69 IN | BODY MASS INDEX: 32.2 KG/M2

## 2022-08-15 DIAGNOSIS — N52.31 ERECTILE DYSFUNCTION AFTER RADICAL PROSTATECTOMY: ICD-10-CM

## 2022-08-15 DIAGNOSIS — Z13.220 SCREENING FOR CHOLESTEROL LEVEL: ICD-10-CM

## 2022-08-15 DIAGNOSIS — B00.2 ORAL HERPES: ICD-10-CM

## 2022-08-15 DIAGNOSIS — Z13.1 SCREENING FOR DIABETES MELLITUS: ICD-10-CM

## 2022-08-15 DIAGNOSIS — L24.0 IRRITANT CONTACT DERMATITIS DUE TO DETERGENT: ICD-10-CM

## 2022-08-15 DIAGNOSIS — Z91.89 AT INCREASED RISK FOR CARDIOVASCULAR DISEASE: ICD-10-CM

## 2022-08-15 DIAGNOSIS — Z13.0 SCREENING FOR DEFICIENCY ANEMIA: ICD-10-CM

## 2022-08-15 PROBLEM — L82.0 INFLAMED SEBORRHEIC KERATOSIS: Status: ACTIVE | Noted: 2019-08-02

## 2022-08-15 PROBLEM — L91.8 SKIN TAG: Status: ACTIVE | Noted: 2019-08-02

## 2022-08-15 PROCEDURE — 99396 PREV VISIT EST AGE 40-64: CPT | Performed by: FAMILY MEDICINE

## 2022-08-15 RX ORDER — SILDENAFIL CITRATE 20 MG/1
TABLET ORAL
Qty: 30 TABLET | Refills: 3 | Status: ON HOLD | OUTPATIENT
Start: 2022-08-15 | End: 2023-12-11

## 2022-08-15 RX ORDER — ACYCLOVIR 400 MG/1
TABLET ORAL
Qty: 45 TABLET | Refills: 1 | Status: ON HOLD | OUTPATIENT
Start: 2022-08-15 | End: 2023-12-11

## 2022-08-15 RX ORDER — TRIAMCINOLONE ACETONIDE 1 MG/G
1 CREAM TOPICAL 2 TIMES DAILY
Qty: 28.4 G | Refills: 3 | Status: SHIPPED | OUTPATIENT
Start: 2022-08-15 | End: 2023-09-15 | Stop reason: SDUPTHER

## 2022-08-15 ASSESSMENT — FIBROSIS 4 INDEX: FIB4 SCORE: 1.19

## 2022-08-15 ASSESSMENT — PATIENT HEALTH QUESTIONNAIRE - PHQ9: CLINICAL INTERPRETATION OF PHQ2 SCORE: 0

## 2022-08-15 NOTE — ASSESSMENT & PLAN NOTE
Discussed risk and benefits of statin and aspirin therapy.  Discussed rhabdomyolysis.  Discussed coffee-ground emesis bleeding issues black stools blood in the stool.  Patient would need to take the medications for 10 years to experience benefit.

## 2022-08-15 NOTE — PROGRESS NOTES
Subjective:     CC:   Chief Complaint   Patient presents with    Annual Exam       HPI:   Dipak Johnson is a 61 y.o. male who presents for an annual exam. He is feeling well and has no complaints. He has a history of   Patient Active Problem List   Diagnosis    Post-procedural erectile dysfunction    Allergic rhinitis    Dermatitis    Epididymitis    Family history of malignant neoplasm of prostate    History of malignant neoplasm of prostate    Hx of appendectomy    Hx of hernia repair    Inflamed seborrheic keratosis    Obesity, unspecified    Skin tag    At increased risk for cardiovascular disease         Health Maintenance  Aspirin and statin therapy: The 10-year ASCVD risk score (Shermancruz CHU Jr., et al., 2013) is: 10%    Exercise: sedentary active  Substance Abuse: none  Sun protection used.  Concerning moles: none    Cancer screening  Colorectal Cancer Screening: up to date  Lung Cancer Screening: n/a  Prostate Cancer Screening/PSA: urology follows    Infectious disease screening/Immunizations  Tdap due      He  has a past medical history of Prostate cancer (HCC).  He  has a past surgical history that includes appendectomy; carpal tunnel release (Left); hernia repair; vasectomy; and prostatectomy, radical retro.  History reviewed. No pertinent family history.  Social History     Tobacco Use    Smoking status: Never    Smokeless tobacco: Never   Vaping Use    Vaping Use: Never used   Substance Use Topics    Alcohol use: Not Currently    Drug use: Not Currently       Patient Active Problem List    Diagnosis Date Noted    At increased risk for cardiovascular disease 08/15/2022    Post-procedural erectile dysfunction 03/23/2021    Inflamed seborrheic keratosis 08/02/2019    Skin tag 08/02/2019    History of malignant neoplasm of prostate 09/21/2016    Family history of malignant neoplasm of prostate 03/05/2013    Epididymitis 02/24/2011    Dermatitis 06/12/2009    Hx of appendectomy 06/12/2009    Obesity,  "unspecified 06/12/2009    Hx of hernia repair 04/17/2007    Allergic rhinitis 03/07/1997       Current Outpatient Medications   Medication Sig Dispense Refill    hydrocortisone 2.5 % Ointment Apply 1 Application topically 2 times a day for 7 days. 20 g 0    acyclovir (ZOVIRAX) 400 MG tablet Take 1 tablet by mouth 3 times a day for 5 days 45 Tablet 1    sildenafil (REVATIO) 20 MG tablet Take one to three tabs orally one hour prior to sexual activity. Do not exceed 3 tabs in 24 hours. 30 Tablet 3    triamcinolone acetonide (KENALOG) 0.1 % Cream Apply 1 Application topically 2 times a day. 28.4 g 3    Multiple Vitamin (MULTI-VITAMIN) Tab Take 1 Tablet by mouth every day.       No current facility-administered medications for this visit.    (including changes today)  Allergies: Other misc    Review of Systems   Constitutional: Negative for fever, chills and malaise/fatigue.   HENT: Negative for congestion.    Eyes: Negative for pain.   Respiratory: Negative for cough and shortness of breath.    Cardiovascular: Negative for leg swelling.   Gastrointestinal: Negative for nausea, vomiting, abdominal pain and diarrhea.   Genitourinary: Negative for dysuria and hematuria.   Skin: Negative for rash.   Neurological: Negative for dizziness, focal weakness and headaches.   Endo/Heme/Allergies: Does not bruise/bleed easily.   Psychiatric/Behavioral: Negative for depression.  The patient is not nervous/anxious.      Objective:     /74   Pulse (!) 56   Temp 36.8 °C (98.3 °F)   Resp 14   Ht 1.753 m (5' 9\")   Wt 98.6 kg (217 lb 6.4 oz)   SpO2 94%   BMI 32.10 kg/m²   Body mass index is 32.1 kg/m².  Wt Readings from Last 4 Encounters:   08/15/22 98.6 kg (217 lb 6.4 oz)   03/23/21 96.6 kg (213 lb)   03/02/21 91.6 kg (202 lb)       Physical Exam:  Constitutional: Well-developed and well-nourished. Not diaphoretic. No distress.   Skin: Skin is warm and dry. No rash noted.  Head: Atraumatic without lesions.  Eyes: Conjunctivae " and extraocular motions are normal. Pupils are equal, round, and reactive to light. No scleral icterus.   Ears:  External ears unremarkable. Tympanic membranes clear and intact.  Mouth/Throat: no lesions of the mouth  Neck: Supple, trachea midline. Normal range of motion. No thyromegaly present. No lymphadenopathy--cervical or supraclavicular.  Cardiovascular: Regular rate and rhythm, S1 and S2 without murmur, rubs, or gallops.    Lungs: Effort normal. Clear to auscultation throughout. No adventitious sounds. No CVA tenderness.  Abdomen: Soft, non tender, and without distention. Active bowel sounds in all four quadrants. No rebound, guarding, masses or HSM.  : Genitalia: defers  Rectal: defers  Extremities: No cyanosis, clubbing, erythema, nor edema. Distal pulses intact and symmetric.   Musculoskeletal: All major joints AROM full in all directions without pain.  Neurological: Alert and oriented x 3.   Psychiatric:  Behavior, mood, and affect are appropriate.      Assessment and Plan:     Problem   At Increased Risk for Cardiovascular Disease   Inflamed Seborrheic Keratosis   Skin Tag   History of Malignant Neoplasm of Prostate    Formatting of this note might be different from the original.  S/P Prostatectomy and being followed by [email or web address]]     Family History of Malignant Neoplasm of Prostate   Epididymitis   Dermatitis   Hx of Appendectomy   Obesity, Unspecified   Hx of Hernia Repair   Allergic Rhinitis     Problem List Items Addressed This Visit       Post-procedural erectile dysfunction    Relevant Medications    sildenafil (REVATIO) 20 MG tablet    At increased risk for cardiovascular disease     Discussed risk and benefits of statin and aspirin therapy.  Discussed rhabdomyolysis.  Discussed coffee-ground emesis bleeding issues black stools blood in the stool.  Patient would need to take the medications for 10 years to experience benefit.          Other Visit Diagnoses       Screening for deficiency anemia         Relevant Orders    CBC WITH DIFFERENTIAL    Screening for diabetes mellitus        Relevant Orders    Comp Metabolic Panel    HEMOGLOBIN A1C    Screening for cholesterol level        Relevant Orders    Lipid Profile    Oral herpes        Relevant Medications    acyclovir (ZOVIRAX) 400 MG tablet    Irritant contact dermatitis due to detergent        Relevant Medications    hydrocortisone 2.5 % Ointment    triamcinolone acetonide (KENALOG) 0.1 % Cream              Follow-up: as needed with concerns and annual for screenings  -Smoking cessation discussed if smoking and encouraged to come to office if quit and tempted or restarts smoking if pertinent to this patient. We discourage use of electronic smoking devises.   -Discussed healthy drinking habits if over 7 for females, 10 for males per week or more than 4 in one day. Women's guidelines likely to change soon as well  -Discussed healthy eating habits, exercise, being physically active, healthy bmi below 25  -patient to provide ages of family members when they were diagnosed with cancer , especially breast and ovarian, pancreatic cancers.  -Reviewed pap history in female patients, if they have a gynecologist they are encouraged to follow up with that doctor for annual pelvic and breast exams. If they prefer to see me for women's health, I will perform pap smear with hpv dna testing, pelvic, and breast exam, these and male genital exams are always done in the presence of a medical assistant and with verbal permission from the patient.   -Colonoscopy history reviewed with those over 44yo or those with early family h/o colon cancer. If patient is due we provide them with various colon cancer screen modalities and relevant information to help the patient decide which is best for them.   -Mammograms recommended yearly for women over 39yo. Risks and benefits are reviewed and discussed with the patient and mammogram script provided.   -PSA discussed with males with family  history of prostate cancer or those concerned. The decision to order this test is made with the patient.   -If patient prescribed medicines then told to review package insert for any warnings, side effects, contra-indications and medication vs medication reactions.   -STD testing added to lab work due to patients age per guideline recommendations  -Patients screened for anxiety and depression. If positive screening patients are offered behavioral health services, medications, and tools to improve mood.   There are no diagnoses linked to this encounter.  -to improve bone health take calcium and vitamin D, perform weight-bearing exercise, in addition we can discuss additional medications if needed including bisphosphonates, parathyroid hormone, and raloxifene.  Esophageal irritation can occur with bisphosphonate therapy this can be reduced by not laying down for 30 min after taking and taking with a full glass of water  -if wearing nail polish on toes or hands asked to rto if there are any dark brown or black areas under the nails  If lab tests ordered, then patient instructed to go to lab/location/plan  If imaging tests ordered, then patient instructed to go to radiology/location/plan  If medicines ordered, then patient instructed to go to pharmacy/location/plan  Health maintenance I reviewed both men and women's health maintenance  Leading causes of death are motor vehicle accidents, cardiovascular disease, malignant tumors, and HIV.   Breast and ovarian cancer mutation screening was suggested if there was an increased risk for the patient based on [place] scoring.   -A general visit to see the eye doctor every one to two years was thought appropriate. Dentist ever 6-12 months.  -Immunization suggestions: Tetanus shot every 10 years, Influenza immunization pneumococcal- anyone with chronic illnesses

## 2022-08-30 ENCOUNTER — HOSPITAL ENCOUNTER (OUTPATIENT)
Dept: LAB | Facility: MEDICAL CENTER | Age: 62
End: 2022-08-30
Attending: FAMILY MEDICINE
Payer: COMMERCIAL

## 2022-08-30 DIAGNOSIS — Z13.0 SCREENING FOR DEFICIENCY ANEMIA: ICD-10-CM

## 2022-08-30 DIAGNOSIS — Z13.220 SCREENING FOR CHOLESTEROL LEVEL: ICD-10-CM

## 2022-08-30 DIAGNOSIS — Z13.1 SCREENING FOR DIABETES MELLITUS: ICD-10-CM

## 2022-08-30 LAB
ALBUMIN SERPL BCP-MCNC: 4.2 G/DL (ref 3.2–4.9)
ALBUMIN/GLOB SERPL: 1.4 G/DL
ALP SERPL-CCNC: 84 U/L (ref 30–99)
ALT SERPL-CCNC: 18 U/L (ref 2–50)
ANION GAP SERPL CALC-SCNC: 10 MMOL/L (ref 7–16)
AST SERPL-CCNC: 21 U/L (ref 12–45)
BASOPHILS # BLD AUTO: 0.7 % (ref 0–1.8)
BASOPHILS # BLD: 0.04 K/UL (ref 0–0.12)
BILIRUB SERPL-MCNC: 0.8 MG/DL (ref 0.1–1.5)
BUN SERPL-MCNC: 20 MG/DL (ref 8–22)
CALCIUM SERPL-MCNC: 9.5 MG/DL (ref 8.5–10.5)
CHLORIDE SERPL-SCNC: 103 MMOL/L (ref 96–112)
CHOLEST SERPL-MCNC: 190 MG/DL (ref 100–199)
CO2 SERPL-SCNC: 26 MMOL/L (ref 20–33)
CREAT SERPL-MCNC: 0.92 MG/DL (ref 0.5–1.4)
EOSINOPHIL # BLD AUTO: 0.1 K/UL (ref 0–0.51)
EOSINOPHIL NFR BLD: 1.8 % (ref 0–6.9)
ERYTHROCYTE [DISTWIDTH] IN BLOOD BY AUTOMATED COUNT: 47.5 FL (ref 35.9–50)
EST. AVERAGE GLUCOSE BLD GHB EST-MCNC: 108 MG/DL
FASTING STATUS PATIENT QL REPORTED: NORMAL
GFR SERPLBLD CREATININE-BSD FMLA CKD-EPI: 93 ML/MIN/1.73 M 2
GLOBULIN SER CALC-MCNC: 2.9 G/DL (ref 1.9–3.5)
GLUCOSE SERPL-MCNC: 104 MG/DL (ref 65–99)
HBA1C MFR BLD: 5.4 % (ref 4–5.6)
HCT VFR BLD AUTO: 47.2 % (ref 42–52)
HDLC SERPL-MCNC: 43 MG/DL
HGB BLD-MCNC: 16.3 G/DL (ref 14–18)
IMM GRANULOCYTES # BLD AUTO: 0.01 K/UL (ref 0–0.11)
IMM GRANULOCYTES NFR BLD AUTO: 0.2 % (ref 0–0.9)
LDLC SERPL CALC-MCNC: 130 MG/DL
LYMPHOCYTES # BLD AUTO: 2.63 K/UL (ref 1–4.8)
LYMPHOCYTES NFR BLD: 47.1 % (ref 22–41)
MCH RBC QN AUTO: 32.5 PG (ref 27–33)
MCHC RBC AUTO-ENTMCNC: 34.5 G/DL (ref 33.7–35.3)
MCV RBC AUTO: 94.2 FL (ref 81.4–97.8)
MONOCYTES # BLD AUTO: 0.68 K/UL (ref 0–0.85)
MONOCYTES NFR BLD AUTO: 12.2 % (ref 0–13.4)
NEUTROPHILS # BLD AUTO: 2.12 K/UL (ref 1.82–7.42)
NEUTROPHILS NFR BLD: 38 % (ref 44–72)
NRBC # BLD AUTO: 0 K/UL
NRBC BLD-RTO: 0 /100 WBC
PLATELET # BLD AUTO: 287 K/UL (ref 164–446)
PMV BLD AUTO: 10.3 FL (ref 9–12.9)
POTASSIUM SERPL-SCNC: 4.1 MMOL/L (ref 3.6–5.5)
PROT SERPL-MCNC: 7.1 G/DL (ref 6–8.2)
RBC # BLD AUTO: 5.01 M/UL (ref 4.7–6.1)
SODIUM SERPL-SCNC: 139 MMOL/L (ref 135–145)
TRIGL SERPL-MCNC: 83 MG/DL (ref 0–149)
WBC # BLD AUTO: 5.6 K/UL (ref 4.8–10.8)

## 2022-08-30 PROCEDURE — 85025 COMPLETE CBC W/AUTO DIFF WBC: CPT

## 2022-08-30 PROCEDURE — 80053 COMPREHEN METABOLIC PANEL: CPT

## 2022-08-30 PROCEDURE — 36415 COLL VENOUS BLD VENIPUNCTURE: CPT

## 2022-08-30 PROCEDURE — 83036 HEMOGLOBIN GLYCOSYLATED A1C: CPT

## 2022-08-30 PROCEDURE — 80061 LIPID PANEL: CPT

## 2022-11-01 ENCOUNTER — TELEPHONE (OUTPATIENT)
Dept: MEDICAL GROUP | Facility: IMAGING CENTER | Age: 62
End: 2022-11-01
Payer: COMMERCIAL

## 2022-12-12 ENCOUNTER — HOSPITAL ENCOUNTER (OUTPATIENT)
Facility: MEDICAL CENTER | Age: 62
End: 2022-12-12
Payer: COMMERCIAL

## 2022-12-12 ENCOUNTER — OFFICE VISIT (OUTPATIENT)
Dept: MEDICAL GROUP | Facility: IMAGING CENTER | Age: 62
End: 2022-12-12
Payer: COMMERCIAL

## 2022-12-12 VITALS
OXYGEN SATURATION: 95 % | DIASTOLIC BLOOD PRESSURE: 72 MMHG | BODY MASS INDEX: 31.7 KG/M2 | TEMPERATURE: 98.6 F | SYSTOLIC BLOOD PRESSURE: 118 MMHG | HEART RATE: 87 BPM | HEIGHT: 70 IN | WEIGHT: 221.4 LBS | RESPIRATION RATE: 16 BRPM

## 2022-12-12 DIAGNOSIS — J32.1 FRONTAL SINUSITIS, UNSPECIFIED CHRONICITY: ICD-10-CM

## 2022-12-12 DIAGNOSIS — J06.9 UPPER RESPIRATORY TRACT INFECTION, UNSPECIFIED TYPE: ICD-10-CM

## 2022-12-12 LAB
EXTERNAL QUALITY CONTROL: NORMAL
FLUAV+FLUBV AG SPEC QL IA: NEGATIVE
INT CON NEG: NORMAL
INT CON NEG: NORMAL
INT CON POS: NORMAL
INT CON POS: NORMAL
SARS-COV+SARS-COV-2 AG RESP QL IA.RAPID: NEGATIVE

## 2022-12-12 PROCEDURE — U0005 INFEC AGEN DETEC AMPLI PROBE: HCPCS

## 2022-12-12 PROCEDURE — 87804 INFLUENZA ASSAY W/OPTIC: CPT

## 2022-12-12 PROCEDURE — 99213 OFFICE O/P EST LOW 20 MIN: CPT

## 2022-12-12 PROCEDURE — 87426 SARSCOV CORONAVIRUS AG IA: CPT

## 2022-12-12 PROCEDURE — U0003 INFECTIOUS AGENT DETECTION BY NUCLEIC ACID (DNA OR RNA); SEVERE ACUTE RESPIRATORY SYNDROME CORONAVIRUS 2 (SARS-COV-2) (CORONAVIRUS DISEASE [COVID-19]), AMPLIFIED PROBE TECHNIQUE, MAKING USE OF HIGH THROUGHPUT TECHNOLOGIES AS DESCRIBED BY CMS-2020-01-R: HCPCS

## 2022-12-12 RX ORDER — AMOXICILLIN AND CLAVULANATE POTASSIUM 875; 125 MG/1; MG/1
1 TABLET, FILM COATED ORAL 2 TIMES DAILY
Qty: 10 TABLET | Refills: 0 | Status: SHIPPED | OUTPATIENT
Start: 2022-12-12 | End: 2022-12-17

## 2022-12-12 ASSESSMENT — FIBROSIS 4 INDEX: FIB4 SCORE: 1.09

## 2022-12-12 NOTE — PROGRESS NOTES
Subjective:     CC:   Chief Complaint   Patient presents with    Sinusitis     Pt reporting cough, little fever    Establish Care    URI       HPI:   Dipak presents today to discuss:    To establish care  Previous PCP Dr. Alice AGUIARI symptoms  Frontal sinusitis  Patient reports worsening symptoms of nasal congestion, sinus pressure, headache, post-nasal drip with productive cough, low grade fever on Saturday. Patient reports of having Covid in September. His upper respiratory symptoms would wax and wane for months and has not fully recovered. He has been traveling non-stop to [place], [place], and [place] to hunt since September. He recently came back from [facility] on Saturday and admits to known sick contacts.   He has taken Advil, Zinc, [place] which provides temporary relief. However, his sinus congestion, pressure, pain with associated headache are worsening. Bending over makes it worse. His eyes are starting to hurt. He does not like taking antibiotics; however, his symptoms are to the point were it is becoming unbearable.   He denies malaise, periorbital edema/swelling, vision changes, stiff neck, severe headache, altered mental status, facial numbness/tingling/weakness, sore throat, cough, shortness of breath, or difficulty breathing.      Past Medical History:   Diagnosis Date    Prostate cancer (HCC)      History reviewed. No pertinent family history.  Past Surgical History:   Procedure Laterality Date    APPENDECTOMY      CARPAL TUNNEL RELEASE Left     HERNIA REPAIR      PROSTATECTOMY, RADICAL RETRO      VASECTOMY       Social History     Tobacco Use    Smoking status: Never    Smokeless tobacco: Never   Vaping Use    Vaping Use: Never used   Substance Use Topics    Alcohol use: Not Currently    Drug use: Not Currently     Social History     Social History Narrative    Not on file     Current Outpatient Medications Ordered in Epic   Medication Sig Dispense Refill    amoxicillin-clavulanate (AUGMENTIN)  "875-125 MG Tab Take 1 Tablet by mouth 2 times a day for 5 days. 10 Tablet 0    Multiple Vitamin (MULTI-VITAMIN) Tab Take 1 Tablet by mouth every day.      acyclovir (ZOVIRAX) 400 MG tablet Take 1 tablet by mouth 3 times a day for 5 days 45 Tablet 1    sildenafil (REVATIO) 20 MG tablet Take one to three tabs orally one hour prior to sexual activity. Do not exceed 3 tabs in 24 hours. 30 Tablet 3    triamcinolone acetonide (KENALOG) 0.1 % Cream Apply 1 Application topically 2 times a day. 28.4 g 3     No current Epic-ordered facility-administered medications on file.     Other misc    ROS: see hpi  Gen: + fevers/chills  Pulm: no sob, no cough  CV: no chest pain, no palpitations, no edema  GI: no nausea/vomiting, no diarrhea  Skin: no rash    Objective:   Exam:  /72 (BP Location: Right arm, Patient Position: Sitting, BP Cuff Size: Adult)   Pulse 87   Temp 37 °C (98.6 °F) (Temporal)   Resp 16   Ht 1.778 m (5' 10\")   Wt 100 kg (221 lb 6.4 oz)   SpO2 95%   BMI 31.77 kg/m²    Body mass index is 31.77 kg/m².    Gen: Alert and oriented, No apparent distress.  HEENT: Head atraumatic, normocephalic. Pupils equal and round. Tenderness over frontal sinus with light palpation. Purulent nasal discharge noted.  Neck: Neck is supple without lymphadenopathy.   Lungs: Normal effort, CTA bilaterally, no wheezes, rhonchi, or rales  CV: Regular rate and rhythm. No murmurs, rubs, or gallops.  ABD: +BS. Non-tender, non-distended. No rebound, rigidity, or guarding.  Ext: No clubbing, cyanosis, edema.    Assessment & Plan:     62 y.o. male with the following -     1. Upper respiratory tract infection, unspecified type  Ongoing and worsening issue for months. Patient presentation likely URI, viral vs allergies. POCT covid and influenza a/b negative in office. Will send for PCR given his known sick contacts.   Recommend supportive measures. Information provided on discharge instructions.     - POCT Influenza A/B  - POCT SARS-COV " Antigen CYNTHIA Manual Result  - SARS-CoV-2, PCR (In-House); Future    2. Frontal sinusitis, unspecified chronicity  Patient presentation consistent with frontal sinusitis.  Discussed treating empirically with Augmentin, medication administration and side effects discussed.  Patient agreeable to this.    Recommend to continue supportive measures:  - increase fluid intake, plenty of rest, and frequent hand washing  - Use a humidifier, especially at night. Cold or warm water humidifiers have the same effect.  -use warm compress over face to help alleviate congestion  -may use nasal corticosteroid spray such as Flonase, 2 sprays each nostril daily  -may consider OTC anti-histamine such as Zyrtec or Claritin for allergy symptoms  - Fish Med squeeze bottle sinus rinses or plain nasal saline twice a day.  - Hot tea + honey + fresh lemon juice  - Honey by itself has been shown to help provide cough relief  - use warm salt water gargles, over-the-counter Cepacol throat lozenges for sore throat  - take OTC immune booster supplementation that contains Vitamin C, D, E, zinc, B vitamins.  - take Mucinex as needed during the day and use throat lozenges such as Ricola. Use dextromethorphan for cough only at night to allow the body to expel the pathogen during the day.  Take 5-10 deep breaths intermittently throughout the day and move the body as tolerated to aid in respiration.  -Take tylenol or Ibuprofen as needed for fever and pain.   Instructed to go to ED for worsening symptoms, persistent fevers, facial swelling, visual changes, weakness, elevated heart rate, stiff neck, prolonged cough, persistent wheezing, leg swelling, or any other concerns. Seek emergency medical care immediately for: Trouble breathing, persistent pain or pressure in the chest, confusion, inability to wake or stay awake, bluish lips or face, persistent tachycardia (fast heart rate), prolonged dizziness, persistent high grade fevers.   -  amoxicillin-clavulanate (AUGMENTIN) 875-125 MG Tab; Take 1 Tablet by mouth 2 times a day for 5 days.  Dispense: 10 Tablet; Refill: 0    Return if symptoms worsen or fail to improve.    Medical Decision Making/Course:  In the course of preparing for this visit with review of the pertinent past medical history, recent and past clinic visits, current medications, and performing chart, immunization, medical history and medication reconciliation, and in the further course of obtaining the current history pertinent to the clinic visit today, performing an exam and evaluation, ordering and independently evaluating labs, tests, and/or procedures, prescribing any recommended new medications as noted above, providing any pertinent counseling and education and recommending further coordination of care. This was discussed with patient in a shared-decision making conversation, and they understand and agreed with plan of care.     BEVERLY Orellana.   [facility]    Please note that this dictation was created using voice recognition software. I have made every reasonable attempt to correct obvious errors, but I expect that there are errors of grammar and possibly content that I did not discover before finalizing the note.

## 2022-12-12 NOTE — PATIENT INSTRUCTIONS
My recommendations:  - increase fluid intake, plenty of rest, and frequent hand washing  - Use a humidifier, especially at night. Cold or warm water humidifiers have the same effect.  -use warm compress over face to help alleviate congestion  -may use nasal corticosteroid spray such as Flonase, 2 sprays each nostril daily  -may consider OTC anti-histamine such as Zyrtec or Claritin for allergy symptoms  - Fish Med squeeze bottle sinus rinses or plain nasal saline twice a day.  - Hot tea + honey + fresh lemon juice  - Honey by itself has been shown to help provide cough relief  - use warm salt water gargles, over-the-counter Cepacol throat lozenges for sore throat  - take OTC immune booster supplementation that contains Vitamin C, D, E, zinc, B vitamins.  - take Mucinex as needed during the day and use throat lozenges such as Ricola. Use dextromethorphan for cough only at night to allow the body to expel the pathogen during the day.  Take 5-10 deep breaths intermittently throughout the day and move the body as tolerated to aid in respiration.  -Take tylenol or Ibuprofen as needed for fever and pain.   Instructed to go to ED for worsening symptoms, persistent fevers, facial swelling, visual changes, weakness, elevated heart rate, stiff neck, prolonged cough, persistent wheezing, leg swelling, or any other concerns. Seek emergency medical care immediately for: Trouble breathing, persistent pain or pressure in the chest, confusion, inability to wake or stay awake, bluish lips or face, persistent tachycardia (fast heart rate), prolonged dizziness, persistent high grade fevers.

## 2022-12-13 LAB
SARS-COV-2 RNA RESP QL NAA+PROBE: NOTDETECTED
SPECIMEN SOURCE: NORMAL

## 2023-04-17 ENCOUNTER — OFFICE VISIT (OUTPATIENT)
Dept: MEDICAL GROUP | Facility: IMAGING CENTER | Age: 63
End: 2023-04-17
Payer: COMMERCIAL

## 2023-04-17 VITALS
SYSTOLIC BLOOD PRESSURE: 130 MMHG | WEIGHT: 216.6 LBS | OXYGEN SATURATION: 95 % | TEMPERATURE: 98.1 F | BODY MASS INDEX: 32.08 KG/M2 | HEIGHT: 69 IN | DIASTOLIC BLOOD PRESSURE: 80 MMHG | HEART RATE: 60 BPM | RESPIRATION RATE: 16 BRPM

## 2023-04-17 DIAGNOSIS — K42.9 UMBILICAL HERNIA WITHOUT OBSTRUCTION AND WITHOUT GANGRENE: ICD-10-CM

## 2023-04-17 DIAGNOSIS — M62.838 MUSCLE SPASM: ICD-10-CM

## 2023-04-17 DIAGNOSIS — R10.33 PERIUMBILICAL ABDOMINAL PAIN: ICD-10-CM

## 2023-04-17 PROCEDURE — 99214 OFFICE O/P EST MOD 30 MIN: CPT

## 2023-04-17 RX ORDER — CYCLOBENZAPRINE HCL 5 MG
5 TABLET ORAL 3 TIMES DAILY PRN
Qty: 30 TABLET | Refills: 0 | Status: ON HOLD | OUTPATIENT
Start: 2023-04-17 | End: 2023-12-11

## 2023-04-17 RX ORDER — ACETAMINOPHEN AND CODEINE PHOSPHATE 300; 30 MG/1; MG/1
1 TABLET ORAL EVERY 6 HOURS PRN
Qty: 24 TABLET | Refills: 0 | Status: SHIPPED | OUTPATIENT
Start: 2023-04-17 | End: 2023-04-17

## 2023-04-17 RX ORDER — ACETAMINOPHEN AND CODEINE PHOSPHATE 300; 30 MG/1; MG/1
1 TABLET ORAL EVERY 6 HOURS PRN
Qty: 10 TABLET | Refills: 0 | Status: SHIPPED | OUTPATIENT
Start: 2023-04-17 | End: 2023-05-17

## 2023-04-17 ASSESSMENT — PATIENT HEALTH QUESTIONNAIRE - PHQ9: CLINICAL INTERPRETATION OF PHQ2 SCORE: 0

## 2023-04-17 ASSESSMENT — FIBROSIS 4 INDEX: FIB4 SCORE: 1.09

## 2023-04-17 NOTE — PROGRESS NOTES
Subjective:     CC:   Chief Complaint   Patient presents with    Abdominal Pain     Hernia  torn right under the belly button pt reported lower ribs feel sore    Back Pain     Pt reported hernia       HPI:   Dipak presents today to discuss:    Abdominal pain  Patient reports developing mid-umbilical abdominal pain radiating to mid-lower back on Friday.  Patient is plays competitive golf. He felt pain in his umbilical hernia while he was putting the ball. He does have an umbilical hernia and was supposed to have a surgical consult but he did not follow up.  His symptoms are progressively worsening and is now radiating to his mid-lower back.  He states constant muscle soreness 4/10 that shoots ups to 7-8/10 with certain position. He describes it as muscle spasms. Laying on his side and helps deviate some of the pain.  OTC medication have are not effective for pain relief.  His pain is unbearable.  Last BM was today which is normal and soft.  He denies alcohol intake.  He denies excessive Tylenol intake.  No fevers, chills, lethargy. No nausea, vomiting, hemoptysis, dyspepsia, hematochezia, melena, or bowel changes.  Denies urinary symptoms.   No saddle anesthesia, sharp shooting pain, numbness, tingling, or weakness to any extremities, or urinary/bowel incontinence.      Past Medical History:   Diagnosis Date    Prostate cancer (HCC)      History reviewed. No pertinent family history.  Past Surgical History:   Procedure Laterality Date    APPENDECTOMY      CARPAL TUNNEL RELEASE Left     HERNIA REPAIR      PROSTATECTOMY, RADICAL RETRO      VASECTOMY       Social History     Tobacco Use    Smoking status: Never    Smokeless tobacco: Never   Vaping Use    Vaping Use: Never used   Substance Use Topics    Alcohol use: Not Currently    Drug use: Not Currently     Social History     Social History Narrative    Not on file     Current Outpatient Medications Ordered in Epic   Medication Sig Dispense Refill    cyclobenzaprine  "(FLEXERIL) 5 mg tablet Take 1 Tablet by mouth 3 times a day as needed for Muscle Spasms. 30 Tablet 0    Acetaminophen-Codeine (TYLENOL/CODEINE #3) 300-30 MG Tab Take 1 Tablet by mouth every 6 hours as needed (pain) for up to 30 days. 10 Tablet 0    Multiple Vitamin (MULTI-VITAMIN) Tab Take 1 Tablet by mouth every day.      acyclovir (ZOVIRAX) 400 MG tablet Take 1 tablet by mouth 3 times a day for 5 days 45 Tablet 1    sildenafil (REVATIO) 20 MG tablet Take one to three tabs orally one hour prior to sexual activity. Do not exceed 3 tabs in 24 hours. 30 Tablet 3    triamcinolone acetonide (KENALOG) 0.1 % Cream Apply 1 Application topically 2 times a day. 28.4 g 3     No current [facility]-ordered facility-administered medications on file.     Other misc    ROS: see hpi  Gen: no fevers/chills  Pulm: no sob, no cough  CV: no chest pain, no palpitations, no edema  GI: no nausea/vomiting, no diarrhea  Skin: no rash    Objective:   Exam:  /80 (BP Location: Left arm, Patient Position: Sitting, BP Cuff Size: Adult)   Pulse 60   Temp 36.7 °C (98.1 °F) (Temporal)   Resp 16   Ht 1.753 m (5' 9\") Comment: pt reported  Wt 98.2 kg (216 lb 9.6 oz)   SpO2 95%   BMI 31.99 kg/m²    Body mass index is 31.99 kg/m².    Gen: Alert and oriented, No apparent distress.  HEENT: Head atraumatic, normocephalic. Pupils equal and round.  Neck: Neck is supple without lymphadenopathy.   Lungs: Normal effort, CTA bilaterally, no wheezes, rhonchi, or rales  CV: Regular rate and rhythm. No murmurs, rubs, or gallops.  ABD: +BS. Non-tender, non-distended. No rebound, rigidity, or guarding.  Ext: No clubbing, cyanosis, edema.    Assessment & Plan:     62 y.o. male with the following -     1. Periumbilical abdominal pain  2. Umbilical hernia without obstruction and without gangrene  New and worsening condition.  Patient presentation likely r/t to his umbilical hernia vs muscle strain.  However, due to patient's pain level, I am concern for possible " strangulation.  Will order CT for evaluation.  We will place referral to general surgery.  Will treat with short course of Tylenol #3 for pain relief.  Medication administration and side effects discussed.  Discussed that this is considered a controlled substance due to potential for abuse.  Do not take with alcohol.  Do not take with Flexeril to reduce CNS depression.  Patient agreeable to this.  Obtained and reviewed patient utilization report from [facility] pharmacy database on 4/17/2023 5:34 PM  prior to writing prescription for controlled substance II, III or IV per [place] bill . Based on assessment of the report, the prescription is medically necessary.   Controlled substance discussed with client. Client agrees to abide by controlled substance contract.  Patient given strict instructions to go to ED for worsening symptoms.    - Referral to General Surgery  - CT-ABDOMEN W/O; Future  - Consent for Opiate Prescription  - Acetaminophen-Codeine (TYLENOL/CODEINE #3) 300-30 MG Tab; Take 1 Tablet by mouth every 6 hours as needed (pain) for up to 30 days.  Dispense: 10 Tablet; Refill: 0    3. Muscle spasm  New and uncontrolled condition.  Likely musculoskeletal strain.  Will treat with Flexeril.  Medication administration and side effects discussed.  Instructed not to take with alcohol to reduce CNS depression.  Do not drive while on this medication.  Recommend conservative therapy: Avoid activities that provoke pain, ice and/or heat pack and 20-minute increments as needed, OTC ibuprofen as needed for pain.  For worsening symptoms, will place referral to physical therapy.    - cyclobenzaprine (FLEXERIL) 5 mg tablet; Take 1 Tablet by mouth 3 times a day as needed for Muscle Spasms.  Dispense: 30 Tablet; Refill: 0    Medical Decision Making/Course:  In the course of preparing for this visit with review of the pertinent past medical history, recent and past clinic visits, current medications, and performing  chart, immunization, medical history and medication reconciliation, and in the further course of obtaining the current history pertinent to the clinic visit today, performing an exam and evaluation, ordering and independently evaluating labs, tests, and/or procedures, prescribing any recommended new medications as noted above, providing any pertinent counseling and education and recommending further coordination of care. This was discussed with patient in a shared-decision making conversation, and they understand and agreed with plan of care.     Return if symptoms worsen or fail to improve.    BEVERLY Orellana.   [facility]    Please note that this dictation was created using voice recognition software. I have made every reasonable attempt to correct obvious errors, but I expect that there are errors of grammar and possibly content that I did not discover before finalizing the note.

## 2023-04-18 ENCOUNTER — HOSPITAL ENCOUNTER (OUTPATIENT)
Dept: RADIOLOGY | Facility: MEDICAL CENTER | Age: 63
End: 2023-04-18
Payer: COMMERCIAL

## 2023-04-18 DIAGNOSIS — R10.33 PERIUMBILICAL ABDOMINAL PAIN: ICD-10-CM

## 2023-04-18 DIAGNOSIS — K42.9 UMBILICAL HERNIA WITHOUT OBSTRUCTION AND WITHOUT GANGRENE: ICD-10-CM

## 2023-04-18 DIAGNOSIS — M62.838 MUSCLE SPASM: ICD-10-CM

## 2023-04-18 PROCEDURE — 1126F AMNT PAIN NOTED NONE PRSNT: CPT

## 2023-04-18 PROCEDURE — 74150 CT ABDOMEN W/O CONTRAST: CPT

## 2023-04-18 RX ORDER — ACETAMINOPHEN AND CODEINE PHOSPHATE 300; 30 MG/1; MG/1
1 TABLET ORAL EVERY 6 HOURS PRN
Qty: 10 TABLET | Refills: 0 | Status: CANCELLED | OUTPATIENT
Start: 2023-04-18 | End: 2023-05-18

## 2023-04-18 NOTE — TELEPHONE ENCOUNTER
Received request via: Pharmacy    Was the patient seen in the last year in this department? Yes    Does the patient have an active prescription (recently filled or refills available) for medication(s) requested? No    Does the patient have [facility] Plus and need 100 day supply (blood pressure, diabetes and cholesterol meds only)? Patient does not have SCP    Pharmacy comment: Please clarify days supply

## 2023-05-23 ENCOUNTER — APPOINTMENT (OUTPATIENT)
Dept: ADMISSIONS | Facility: MEDICAL CENTER | Age: 63
End: 2023-05-23
Attending: SURGERY
Payer: COMMERCIAL

## 2023-05-24 ENCOUNTER — PRE-ADMISSION TESTING (OUTPATIENT)
Dept: ADMISSIONS | Facility: MEDICAL CENTER | Age: 63
End: 2023-05-24
Attending: SURGERY
Payer: COMMERCIAL

## 2023-05-24 VITALS — HEIGHT: 69 IN | BODY MASS INDEX: 31.99 KG/M2

## 2023-05-24 NOTE — PREPROCEDURE INSTRUCTIONS
Pre admit apt: Pt. Instructed to continue regularly prescribed medications through day before surgery.  Instructed to take the following medications, the day of surgery, with a sip of water per anesthesia protocol: flexeril  METS greater than 4  Pt states no issues with anesthesia, except sometimes PONV  Instructed pt to notify Dr. Sanz if he develops any new sxs of covid/illness prior to surgery.

## 2023-06-15 ENCOUNTER — ANESTHESIA EVENT (OUTPATIENT)
Dept: SURGERY | Facility: MEDICAL CENTER | Age: 63
End: 2023-06-15
Payer: COMMERCIAL

## 2023-06-16 ENCOUNTER — HOSPITAL ENCOUNTER (OUTPATIENT)
Facility: MEDICAL CENTER | Age: 63
End: 2023-06-16
Attending: SURGERY | Admitting: SURGERY
Payer: COMMERCIAL

## 2023-06-16 ENCOUNTER — ANESTHESIA (OUTPATIENT)
Dept: SURGERY | Facility: MEDICAL CENTER | Age: 63
End: 2023-06-16
Payer: COMMERCIAL

## 2023-06-16 VITALS
TEMPERATURE: 96.8 F | HEART RATE: 50 BPM | DIASTOLIC BLOOD PRESSURE: 73 MMHG | HEIGHT: 69 IN | SYSTOLIC BLOOD PRESSURE: 128 MMHG | BODY MASS INDEX: 32.39 KG/M2 | OXYGEN SATURATION: 93 % | RESPIRATION RATE: 18 BRPM | WEIGHT: 218.7 LBS

## 2023-06-16 DIAGNOSIS — G89.18 POST-OP PAIN: ICD-10-CM

## 2023-06-16 PROCEDURE — 00790 ANES IPER UPR ABD NOS: CPT | Performed by: ANESTHESIOLOGY

## 2023-06-16 PROCEDURE — 700102 HCHG RX REV CODE 250 W/ 637 OVERRIDE(OP): Performed by: ANESTHESIOLOGY

## 2023-06-16 PROCEDURE — 160042 HCHG SURGERY MINUTES - EA ADDL 1 MIN LEVEL 5: Performed by: SURGERY

## 2023-06-16 PROCEDURE — 160035 HCHG PACU - 1ST 60 MINS PHASE I: Performed by: SURGERY

## 2023-06-16 PROCEDURE — 160036 HCHG PACU - EA ADDL 30 MINS PHASE I: Performed by: SURGERY

## 2023-06-16 PROCEDURE — A9270 NON-COVERED ITEM OR SERVICE: HCPCS | Performed by: ANESTHESIOLOGY

## 2023-06-16 PROCEDURE — 700105 HCHG RX REV CODE 258: Performed by: ANESTHESIOLOGY

## 2023-06-16 PROCEDURE — 502714 HCHG ROBOTIC SURGERY SERVICES: Performed by: SURGERY

## 2023-06-16 PROCEDURE — 160009 HCHG ANES TIME/MIN: Performed by: SURGERY

## 2023-06-16 PROCEDURE — 160031 HCHG SURGERY MINUTES - 1ST 30 MINS LEVEL 5: Performed by: SURGERY

## 2023-06-16 PROCEDURE — 700101 HCHG RX REV CODE 250: Performed by: ANESTHESIOLOGY

## 2023-06-16 PROCEDURE — 160025 RECOVERY II MINUTES (STATS): Performed by: SURGERY

## 2023-06-16 PROCEDURE — 160002 HCHG RECOVERY MINUTES (STAT): Performed by: SURGERY

## 2023-06-16 PROCEDURE — 160046 HCHG PACU - 1ST 60 MINS PHASE II: Performed by: SURGERY

## 2023-06-16 PROCEDURE — 700111 HCHG RX REV CODE 636 W/ 250 OVERRIDE (IP): Performed by: SURGERY

## 2023-06-16 PROCEDURE — 160048 HCHG OR STATISTICAL LEVEL 1-5: Performed by: SURGERY

## 2023-06-16 PROCEDURE — C1781 MESH (IMPLANTABLE): HCPCS | Performed by: SURGERY

## 2023-06-16 PROCEDURE — 700101 HCHG RX REV CODE 250: Performed by: SURGERY

## 2023-06-16 PROCEDURE — 700111 HCHG RX REV CODE 636 W/ 250 OVERRIDE (IP): Performed by: ANESTHESIOLOGY

## 2023-06-16 DEVICE — MESH LAP PROGRIP FLAT SHEET 16 X 12: Type: IMPLANTABLE DEVICE | Status: FUNCTIONAL

## 2023-06-16 RX ORDER — ONDANSETRON 2 MG/ML
4 INJECTION INTRAMUSCULAR; INTRAVENOUS
Status: DISCONTINUED | OUTPATIENT
Start: 2023-06-16 | End: 2023-06-16 | Stop reason: HOSPADM

## 2023-06-16 RX ORDER — CEFAZOLIN SODIUM 1 G/3ML
INJECTION, POWDER, FOR SOLUTION INTRAMUSCULAR; INTRAVENOUS PRN
Status: DISCONTINUED | OUTPATIENT
Start: 2023-06-16 | End: 2023-06-16 | Stop reason: SURG

## 2023-06-16 RX ORDER — BUPIVACAINE HYDROCHLORIDE AND EPINEPHRINE 5; 5 MG/ML; UG/ML
INJECTION, SOLUTION EPIDURAL; INTRACAUDAL; PERINEURAL
Status: DISCONTINUED | OUTPATIENT
Start: 2023-06-16 | End: 2023-06-16 | Stop reason: HOSPADM

## 2023-06-16 RX ORDER — OXYCODONE HYDROCHLORIDE AND ACETAMINOPHEN 5; 325 MG/1; MG/1
1 TABLET ORAL EVERY 4 HOURS PRN
Qty: 20 TABLET | Refills: 0 | Status: SHIPPED | OUTPATIENT
Start: 2023-06-16 | End: 2023-06-21

## 2023-06-16 RX ORDER — HYDROMORPHONE HYDROCHLORIDE 1 MG/ML
0.4 INJECTION, SOLUTION INTRAMUSCULAR; INTRAVENOUS; SUBCUTANEOUS
Status: DISCONTINUED | OUTPATIENT
Start: 2023-06-16 | End: 2023-06-16 | Stop reason: HOSPADM

## 2023-06-16 RX ORDER — ONDANSETRON 2 MG/ML
INJECTION INTRAMUSCULAR; INTRAVENOUS PRN
Status: DISCONTINUED | OUTPATIENT
Start: 2023-06-16 | End: 2023-06-16 | Stop reason: SURG

## 2023-06-16 RX ORDER — HYDROMORPHONE HYDROCHLORIDE 1 MG/ML
0.1 INJECTION, SOLUTION INTRAMUSCULAR; INTRAVENOUS; SUBCUTANEOUS
Status: DISCONTINUED | OUTPATIENT
Start: 2023-06-16 | End: 2023-06-16 | Stop reason: HOSPADM

## 2023-06-16 RX ORDER — OXYCODONE HCL 5 MG/5 ML
10 SOLUTION, ORAL ORAL
Status: COMPLETED | OUTPATIENT
Start: 2023-06-16 | End: 2023-06-16

## 2023-06-16 RX ORDER — EPINEPHRINE 1 MG/ML(1)
AMPUL (ML) INJECTION
Status: DISCONTINUED | OUTPATIENT
Start: 2023-06-16 | End: 2023-06-16 | Stop reason: HOSPADM

## 2023-06-16 RX ORDER — ROCURONIUM BROMIDE 10 MG/ML
INJECTION, SOLUTION INTRAVENOUS PRN
Status: DISCONTINUED | OUTPATIENT
Start: 2023-06-16 | End: 2023-06-16 | Stop reason: SURG

## 2023-06-16 RX ORDER — OXYCODONE HCL 5 MG/5 ML
5 SOLUTION, ORAL ORAL
Status: COMPLETED | OUTPATIENT
Start: 2023-06-16 | End: 2023-06-16

## 2023-06-16 RX ORDER — HYDROMORPHONE HYDROCHLORIDE 1 MG/ML
0.2 INJECTION, SOLUTION INTRAMUSCULAR; INTRAVENOUS; SUBCUTANEOUS
Status: DISCONTINUED | OUTPATIENT
Start: 2023-06-16 | End: 2023-06-16 | Stop reason: HOSPADM

## 2023-06-16 RX ORDER — DIPHENHYDRAMINE HYDROCHLORIDE 50 MG/ML
12.5 INJECTION INTRAMUSCULAR; INTRAVENOUS
Status: DISCONTINUED | OUTPATIENT
Start: 2023-06-16 | End: 2023-06-16 | Stop reason: HOSPADM

## 2023-06-16 RX ORDER — GLYCOPYRROLATE 0.2 MG/ML
INJECTION INTRAMUSCULAR; INTRAVENOUS PRN
Status: DISCONTINUED | OUTPATIENT
Start: 2023-06-16 | End: 2023-06-16 | Stop reason: SURG

## 2023-06-16 RX ORDER — SODIUM CHLORIDE, SODIUM LACTATE, POTASSIUM CHLORIDE, CALCIUM CHLORIDE 600; 310; 30; 20 MG/100ML; MG/100ML; MG/100ML; MG/100ML
INJECTION, SOLUTION INTRAVENOUS CONTINUOUS
Status: DISCONTINUED | OUTPATIENT
Start: 2023-06-16 | End: 2023-06-16 | Stop reason: HOSPADM

## 2023-06-16 RX ORDER — DEXAMETHASONE SODIUM PHOSPHATE 4 MG/ML
INJECTION, SOLUTION INTRA-ARTICULAR; INTRALESIONAL; INTRAMUSCULAR; INTRAVENOUS; SOFT TISSUE PRN
Status: DISCONTINUED | OUTPATIENT
Start: 2023-06-16 | End: 2023-06-16 | Stop reason: SURG

## 2023-06-16 RX ORDER — LIDOCAINE HYDROCHLORIDE 10 MG/ML
INJECTION, SOLUTION INFILTRATION; PERINEURAL
Status: DISCONTINUED | OUTPATIENT
Start: 2023-06-16 | End: 2023-06-16 | Stop reason: HOSPADM

## 2023-06-16 RX ADMIN — ONDANSETRON 8 MG: 2 INJECTION INTRAMUSCULAR; INTRAVENOUS at 09:38

## 2023-06-16 RX ADMIN — FENTANYL CITRATE 50 MCG: 50 INJECTION, SOLUTION INTRAMUSCULAR; INTRAVENOUS at 10:01

## 2023-06-16 RX ADMIN — HYDROMORPHONE HYDROCHLORIDE 0.4 MG: 1 INJECTION, SOLUTION INTRAMUSCULAR; INTRAVENOUS; SUBCUTANEOUS at 11:15

## 2023-06-16 RX ADMIN — DEXAMETHASONE SODIUM PHOSPHATE 8 MG: 4 INJECTION INTRA-ARTICULAR; INTRALESIONAL; INTRAMUSCULAR; INTRAVENOUS; SOFT TISSUE at 08:52

## 2023-06-16 RX ADMIN — FENTANYL CITRATE 100 MCG: 50 INJECTION, SOLUTION INTRAMUSCULAR; INTRAVENOUS at 08:43

## 2023-06-16 RX ADMIN — OXYCODONE HYDROCHLORIDE 10 MG: 5 SOLUTION ORAL at 10:01

## 2023-06-16 RX ADMIN — SODIUM CHLORIDE, POTASSIUM CHLORIDE, SODIUM LACTATE AND CALCIUM CHLORIDE: 600; 310; 30; 20 INJECTION, SOLUTION INTRAVENOUS at 11:08

## 2023-06-16 RX ADMIN — ROCURONIUM BROMIDE 50 MG: 50 INJECTION, SOLUTION INTRAVENOUS at 08:48

## 2023-06-16 RX ADMIN — HYDROMORPHONE HYDROCHLORIDE 0.4 MG: 1 INJECTION, SOLUTION INTRAMUSCULAR; INTRAVENOUS; SUBCUTANEOUS at 10:37

## 2023-06-16 RX ADMIN — FENTANYL CITRATE 50 MCG: 50 INJECTION, SOLUTION INTRAMUSCULAR; INTRAVENOUS at 10:09

## 2023-06-16 RX ADMIN — CEFAZOLIN 2 G: 1 INJECTION, POWDER, FOR SOLUTION INTRAMUSCULAR; INTRAVENOUS at 08:52

## 2023-06-16 RX ADMIN — FENTANYL CITRATE 100 MCG: 50 INJECTION, SOLUTION INTRAMUSCULAR; INTRAVENOUS at 09:13

## 2023-06-16 RX ADMIN — GLYCOPYRROLATE 0.2 MG: 0.2 INJECTION INTRAMUSCULAR; INTRAVENOUS at 09:12

## 2023-06-16 RX ADMIN — SUGAMMADEX 200 MG: 100 INJECTION, SOLUTION INTRAVENOUS at 09:40

## 2023-06-16 RX ADMIN — PROPOFOL 200 MG: 10 INJECTION, EMULSION INTRAVENOUS at 08:47

## 2023-06-16 RX ADMIN — ROCURONIUM BROMIDE 10 MG: 50 INJECTION, SOLUTION INTRAVENOUS at 09:19

## 2023-06-16 ASSESSMENT — PAIN DESCRIPTION - PAIN TYPE
TYPE: SURGICAL PAIN

## 2023-06-16 ASSESSMENT — FIBROSIS 4 INDEX: FIB4 SCORE: 1.09

## 2023-06-16 ASSESSMENT — PAIN SCALES - GENERAL: PAIN_LEVEL: 0

## 2023-06-16 NOTE — OR NURSING
0952: Patient arrived to PACU from OR via gurney. Report received from anesthesia and RN. Respirations are spontaneous and unlabored. VSS on 6L. Sites clean, dry and intact with abdominal binder in place. Cold pack applied.     1001: Pt reports 8/10 pain. PO and IV analgesia given, see MAR. Denies nausea.    1009: No change in pain. IV analgesia given; See MAR. Pt tolerating PO.    1015: Report to Diana MOJICA.    1030: Report from Diana MOJICA. Reports 8/10 pain and denies nausea. Family updated by Diana.     1037: IV analgesia given; See MAR.     1050: Patient reports minimal change in pain but states it is tolerable and declines further medication at this time.     1110: Patient continues to decline further medication. Tolerating ice chips. Given IS with instructions on use. Able to pull 1000 with goal of 3000.     1115: Requesting pain medication. See MAR.    1143: SO updated at this time.     1145: Recovery completed at this time. Patient meets criteria for transfer to stage 2. Report given to RN.    1148: CNA at bedside to transfer patient to stage 2.

## 2023-06-16 NOTE — DISCHARGE INSTRUCTIONS
What to Expect Post Anesthesia    Rest and take it easy for the first 24 hours.  A responsible adult is recommended to remain with you during that time.  It is normal to feel sleepy.  We encourage you to not do anything that requires balance, judgment or coordination.    FOR 24 HOURS DO NOT:  Drive, operate machinery or run household appliances.  Drink beer or alcoholic beverages.  Make important decisions or sign legal documents.    To avoid nausea, slowly advance diet as tolerated, avoiding spicy or greasy foods for the first day.  Add more substantial food to your diet according to your provider's instructions.  Babies can be fed formula or breast milk as soon as they are hungry.  INCREASE FLUIDS AND FIBER TO AVOID CONSTIPATION.    MILD FLU-LIKE SYMPTOMS ARE NORMAL.  YOU MAY EXPERIENCE GENERALIZED MUSCLE ACHES, THROAT IRRITATION, HEADACHE AND/OR SOME NAUSEA.    If any questions arise, call your provider.  If your provider is not available, please feel free to call the Surgical Center at [phone].    MEDICATIONS: Resume taking daily medication.  Take prescribed pain medication with food.  If no medication is prescribed, you may take non-aspirin pain medication if needed.  PAIN MEDICATION CAN BE VERY CONSTIPATING.  Take a stool softener or laxative such as senokot, pericolace, or milk of magnesia if needed.    Last pain medication given at: 10 mg oxycodone given at 10 am    Diet  Resume your normal diet as tolerated.  A diet low in cholesterol, fat, and sodium is recommended for good health.     Hernia Repair Discharge Instructions:    1.   The pain medication is extremely constipating.    Take a stool softener and drink lots of water.  It also can be addicting.  Please try to transition to an over the counter pain remedy as soon as possible.     2.   Alternating ice and heat for 30 minutes can greatly reduce your pain.     3.   It's ok to shower on the day after your surgery.   Do not scrub the incisions.      4.   After laparoscopy, it's common to have shoulder pain or pain when you take a deep breath.   If the pain radiates down your arm, call or present to the ER.     5.   Please call for redness or drainage from the wound sites that persists.     6.   Feel free to call with questions at [phone].   If you have paperwork that needs filling out, please contact us at this number.     7.   Follow up with me in 1-2 weeks for your postoperative exam.     8.   Activity restrictions vary according to the surgery.   If it hurts, don't do it.   You may begin light exercise at 7-10 days.     Abiel Sanz MD [facility]

## 2023-06-16 NOTE — OR NURSING
1015 Received report from Belkis MOJICA, assumed care of pt. Pt awake/arousable on calling, denies nausea and reports 7/10 pain with non-labored and spontaneous respirations via 3L mask. Pt recently medication for pain will continue to re-assess.  1030 No changes, report to YUNIOR Tamayo.

## 2023-06-16 NOTE — OP REPORT
OPERATIVE NOTE    PREOPERATIVE DIAGNOSIS: Incisional hernia, umbilical hernia    POSTOPERATIVE DIAGNOSIS: Same    PROCEDURE PERFORMED: Robotic assisted repair of an abdominal wall hernia 3.5 cm with incarcerated preperitoneal fat    SURGEON: Abiel Sanz M.D.    ASSISTANT: FLOR Braxton    An assistant was required for the safe completion of the surgical case.  The assistant provided retraction, exposure, performed wound closure and assisted with instrumentation promoting the efficiency of the surgery performed.  I felt an assistant was necessary in the interest of safety and efficiency.  My request for assistance is based on my training and experience and should be patently obvious to the most casual observer as necessary.     ANESTHESIOLOGIST:  Anesthesiologist: Rusty Huber M.D.     ANESTHESIA: general     FINDINGS: Incarcerated preperitoneal fat and a 1.5 cm umbilical hernia and 2 cm incisional hernia.     WOUND CLASS: Clean    SPECIMEN: None    ESTIMATED BLOOD LOSS: 5 mL    Indications: Patient is a 62-year-old male who presents with an umbilical hernia and a supraumbilical incisional hernia from a prostate extraction.  Patient was counseled extensively as of the risk first benefits of surgery and agreed to proceed fully informed.    Description:    The patient was prepped and draped in the standard sterile surgical fashion after induction of general anesthesia. An appropriate timeout was performed and antibiotics delivered. The patient was in the flexed supine position. A right upper quadrant Veress insertion was performed and the abdomen was insufflated to 15 mmHg CO2. A robotic  trocar was applied. 2 more robotic trochars were placed under direct visualization.  I applied tumescent to the preperitoneal space to facilitate dissection, help with hemostasis, and help with pain.  The robot was then docked and I took my position at the console.    Using a combination of electrocautery and sharp dissection  I created a preperitoneal flap. I dissected to the hernias which I reduced under direct visualization.  It was a 1.5 cm defect for the umbilical hernia with incarcerated preperitoneal fat and a 2 cm incisional hernia superiorly. I created a large preperitoneal space with care not to make defects in the peritoneum. I the used a 30 cm Stratisfix suture to reapproximate the hernia defects and plicate the diastases. I then used a measuring device to measure the preperitoneal space. I used a 12 x 16 cm progress mesh which I fashioned a fit the preperitoneal space. This had excellent coverage of the defects and laid flat against the abdominal wall.    I then used a 2nd Stratisfix suture to close the preperitoneal space. I used vicryl to close small rents in the peritoneum made during dissection.  This completely had the mesh from the abdominal cavity. Hemostasis was meticulously achieved. The needles were withdrawn under direct visualization.  The robot was then undocked.  Monocryl suture was used to re-approximate the skin edges. Dermabond was applied as a dressing and the patient was extubated, to recovery in satisfactory condition.    Disposition:    To the PACU for recovery. The patient will be discharged should they meet criteria.          ____________________________________     Abiel Sanz M.D.    DT: 6/16/2023  9:40 AM      Cc: MAGALY Orellana

## 2023-06-16 NOTE — ANESTHESIA PREPROCEDURE EVALUATION
Case: [id number] Date/Time: 06/16/23 0845    Procedure: ROBOTIC TRANSABDOMINAL PRE PERITONEAL INCISIONAL HERNIA REPAIR    Pre-op diagnosis: INCISIONAL HERNIA    Location: [facility] OR 01 / SURGERY [facility]    Surgeons: Abiel Sanz M.D.          Relevant Problems   No relevant active problems       Physical Exam    Airway   Mallampati: II  TM distance: >3 FB  Neck ROM: full       Cardiovascular - normal exam  Rhythm: regular  Rate: normal  (-) murmur     Dental - normal exam           Pulmonary - normal exam  Breath sounds clear to auscultation     Abdominal    Neurological - normal exam                 Anesthesia Plan    ASA 2       Plan - general       Airway plan will be ETT    (Pt c/o sore throat after last surgery)      Induction: intravenous    Postoperative Plan: Postoperative administration of opioids is intended.    Pertinent diagnostic labs and testing reviewed    Informed Consent:    Anesthetic plan and risks discussed with patient.    Use of blood products discussed with: patient whom consented to blood products.

## 2023-06-16 NOTE — ANESTHESIA POSTPROCEDURE EVALUATION
Patient: Dipak Johnson    Procedure Summary     Date: 06/16/23 Room / Location: [place] [address] / SURGERY [facility]    Anesthesia Start: 0842 Anesthesia Stop: 0953    Procedure: ROBOTIC TRANSABDOMINAL PRE PERITONEAL INCISIONAL HERNIA REPAIR (Abdomen) Diagnosis:       Incisional hernia      (INCISIONAL HERNIA)    Surgeons: Abiel Sanz M.D. Responsible Provider: Rusty Huber M.D.    Anesthesia Type: general ASA Status: 2          Final Anesthesia Type: general  Last vitals  BP   Blood Pressure: (!) 141/78    Temp   36.2 °C (97.2 °F)    Pulse   60   Resp   18    SpO2   95 %      Anesthesia Post Evaluation    Patient location during evaluation: PACU  Patient participation: complete - patient participated  Level of consciousness: awake and alert  Pain score: 0    Airway patency: patent  Anesthetic complications: no  Cardiovascular status: hemodynamically stable  Respiratory status: acceptable  Hydration status: euvolemic    PONV: none          There were no known notable events for this encounter.     Nurse Pain Score: 0 (NPRS)

## 2023-06-16 NOTE — ANESTHESIA TIME REPORT
Anesthesia Start and Stop Event Times     Date Time Event    6/16/2023 0831 Ready for Procedure     0842 Anesthesia Start     0953 Anesthesia Stop        Responsible Staff  06/16/23    Name Role Begin End    Rusty Huber M.D. Anesth 0842 0953        Overtime Reason:  no overtime (within assigned shift)    Comments:

## 2023-06-16 NOTE — ANESTHESIA PROCEDURE NOTES
Airway    Date/Time: 6/16/2023 8:50 AM    Performed by: Rusty Huber M.D.  Authorized by: Rusty Huber M.D.    Location:  OR  Urgency:  Elective  Indications for Airway Management:  Anesthesia      Spontaneous Ventilation: absent    Sedation Level:  Deep  Preoxygenated: Yes    Patient Position:  Sniffing  Final Airway Type:  Endotracheal airway  Final Endotracheal Airway:  ETT  Cuffed: Yes    Technique Used for Successful ETT Placement:  Direct laryngoscopy    Insertion Site:  Oral  Blade Type:  Glide  Laryngoscope Blade/Videolaryngoscope Blade Size:  4  ETT Size (mm):  7.5  Measured from:  Teeth  ETT to Teeth (cm):  25  Placement Verified by: auscultation and capnometry    Cormack-Lehane Classification:  Grade I - full view of glottis  Number of Attempts at Approach:  1   Deep plane of anesthesia obtained. Cords completely open. Tube carefully placed. No teeth contact

## 2023-06-16 NOTE — OR NURSING
1147: Pt arrive to stage 2 via gurney. Dressed and up to chair. Denies pain and nausea. Stable on RA.     1155: Pt able to void. Tolerating po fluids.     1243: Family present. DC education provided to pt and pt family, both verbalized understanding. D/Kai to care of family post uneventful stay in PACU 2. Assisted out to car in .

## 2023-07-05 ENCOUNTER — OFFICE VISIT (OUTPATIENT)
Dept: DERMATOLOGY | Facility: IMAGING CENTER | Age: 63
End: 2023-07-05
Payer: COMMERCIAL

## 2023-07-05 DIAGNOSIS — L91.8 ACROCHORDON: ICD-10-CM

## 2023-07-05 DIAGNOSIS — L73.8 SEBACEOUS HYPERPLASIA: ICD-10-CM

## 2023-07-05 DIAGNOSIS — L82.1 SK (SEBORRHEIC KERATOSIS): ICD-10-CM

## 2023-07-05 DIAGNOSIS — L81.4 LENTIGINES: ICD-10-CM

## 2023-07-05 DIAGNOSIS — Z12.83 SKIN CANCER SCREENING: ICD-10-CM

## 2023-07-05 DIAGNOSIS — D22.9 MULTIPLE NEVI: ICD-10-CM

## 2023-07-05 DIAGNOSIS — D18.01 CHERRY ANGIOMA: ICD-10-CM

## 2023-07-05 PROCEDURE — 99212 OFFICE O/P EST SF 10 MIN: CPT | Performed by: NURSE PRACTITIONER

## 2023-07-05 NOTE — PROGRESS NOTES
DERMATOLOGY NOTE  FOLLOW UP VISIT       Chief complaint: Follow-Up (CAMRYN)  Denies new, growing, changing, itching or bleeding skin lesions today.    History of skin cancer: No  History of precancers/actinic keratoses: Yes,  tx LN2  History of biopsies:Yes, Details: benign  History of blistering/severe sunburns:No  Family history of skin cancer:No  Family history of atypical moles:No      Allergies   Allergen Reactions    Other Misc Rash     Allergic to oil of palm        MEDICATIONS:  Medications relevant to specialty reviewed.     REVIEW OF SYSTEMS:   Positive for skin (see HPI)  Negative for fevers and chills       EXAM:  There were no vitals taken for this visit.  Constitutional: Well-developed, well-nourished, and in no distress.     A total body skin exam was performed excluding the genitals per patient preference and including the following areas: head (including face), neck, chest, abdomen, groin/buttocks, back, bilateral upper extremities, and bilateral lower extremities with the following pertinent findings listed below and/or in assessment/plan.     -sun exposed skin of trunk and b/l upper, lower extremities and face with scattered clinically benign light brown reticulated macules all of which were morphologically similar and none of which were suspicious for skin cancer today on exam    Several tan medium brown skin-colored stuck-on waxy papules scattered on the face, trunk and extremities    Multiple tan medium brown skin-colored macules papules scattered over the trunk >> extremities, All with benign-appearing pigment network patterns on dermoscopy    Several scattered 1-3mm bright red macules and thin papules on the trunk and extremities    Sebaceous hyperplasia to forehead    Milia to face    Healing Laparotomy sites for recent hernia surgery left abdomen    Acrochordon neckline    IMPRESSION / PLAN:    1. Lentigines  - Benign-appearing nature of lesions discussed during exam.   - Advised to continue  to monitor for any return to clinic for new or concerning changes.      2. Cherry angioma  - Benign-appearing nature of lesions discussed during exam.   - Advised to continue to monitor for any return to clinic for new or concerning changes.      3. Multiple nevi  - Benign-appearing nature of lesions discussed during exam.   - Advised to continue to monitor for any return to clinic for new or concerning changes.  - ABCDE's of melanoma discussed/handout given      4. SK (seborrheic keratosis)  - Benign-appearing nature of lesions discussed during exam.   - Advised to continue to monitor for any return to clinic for new or concerning changes.      5. Sebaceous hyperplasia  - Benign-appearing nature of lesions discussed during exam.   - Advised to continue to monitor for any return to clinic for new or concerning changes.    6. Acrochordon  - Benign-appearing nature of lesions discussed during exam.   - courtesy LNBayron applied to 5 tiny tags on neckline  - Advised to continue to monitor for any return to clinic for new or concerning changes.      7. Skin cancer screening  Skin cancer education  discussed importance of sun protective clothing, eyewear in addition to the use of broad spectrum sunscreen with SPF 30 or greater, as well as need for reapplication ~every 2 hours when exposed to UVR/handout given  discussed importance following up for any new or changing lesions as noted in handout given, but every 12 months exams in clinic in the setting of dermatologic history  ABCDE's of melanoma discussed/handout given          I have performed a physical exam and reviewed and updated ROS and Plan today (7/5/2023). In review of dermatology visit (5/3/2022), there are no changes except as documented above.        Please note that this dictation was created using voice recognition software. I have made every reasonable attempt to correct obvious errors, but I expect that there are errors of grammar and possibly content that I  did not discover before finalizing the note.      Return to clinic in: Return in about 1 year (around 7/5/2024) for CAMRYN. and as needed for any new or changing skin lesions.

## 2023-07-10 ENCOUNTER — HOSPITAL ENCOUNTER (OUTPATIENT)
Dept: RADIOLOGY | Facility: MEDICAL CENTER | Age: 63
End: 2023-07-10
Attending: RADIOLOGY
Payer: COMMERCIAL

## 2023-07-10 DIAGNOSIS — C61 MALIGNANT NEOPLASM OF PROSTATE (HCC): ICD-10-CM

## 2023-07-10 PROCEDURE — A9595 CT-PETCT-PROSTATE SCAN SKULL BASE TO MID-THIGH (PYLARIFY OR AXUMIN): HCPCS

## 2023-09-15 ENCOUNTER — OFFICE VISIT (OUTPATIENT)
Dept: DERMATOLOGY | Facility: IMAGING CENTER | Age: 63
End: 2023-09-15
Payer: COMMERCIAL

## 2023-09-15 DIAGNOSIS — L24.0 IRRITANT CONTACT DERMATITIS DUE TO DETERGENT: ICD-10-CM

## 2023-09-15 DIAGNOSIS — L50.9 HIVES: ICD-10-CM

## 2023-09-15 PROCEDURE — 99213 OFFICE O/P EST LOW 20 MIN: CPT | Performed by: DERMATOLOGY

## 2023-09-15 RX ORDER — TRIAMCINOLONE ACETONIDE 1 MG/G
1 CREAM TOPICAL 2 TIMES DAILY
Qty: 80 G | Refills: 0 | Status: SHIPPED | OUTPATIENT
Start: 2023-09-15 | End: 2023-09-29 | Stop reason: SDUPTHER

## 2023-09-15 NOTE — PROGRESS NOTES
Chief complaint: Follow-Up and Rash       Subjective: Previously seen patient here for hives. - gets about twice yearly.   Broke out in Hives a week-10days  ago.  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 Tries to avoid.  Lots of recent medical stress - double hernia repair, cracked teeth. Now undergoing prostate radiation for recurrence of prostate cancer    Denies known preceding URI    Trx with TAC 0.1% cream-body only    History of skin cancer: No  History of precancers/actinic keratoses: Yes,  tx LN2  History of biopsies:Yes, Details: benign  History of blistering/severe sunburns:No  Family history of skin cancer:No  Family history of atypical moles:No     ROS: no fevers/chills. No cough. Itch!!     PE: Gen: WDWN male in NAD. Skin: urticarial plaques - torso and back/hips-thighs most prominent.  Urticarial papules axilla.    IMPRESSION / PLAN:  Acute urticaria: consider sweat excretion of culprit with axilla affected  -advised re: OTC antihistamine use - to start Zrytec 10mg PO Qday-BID X 30 days  -advised keeping cool  -TAC 0.1% cream BID -->PRN, axilla  -lidex cream BID -->PRN body  -to f/u 4 weeks

## 2023-09-18 ENCOUNTER — TELEPHONE (OUTPATIENT)
Dept: DERMATOLOGY | Facility: IMAGING CENTER | Age: 63
End: 2023-09-18
Payer: COMMERCIAL

## 2023-09-18 NOTE — TELEPHONE ENCOUNTER
Patient was seen 09/15 having issues with hives . Still has on going scalp irritation and itchy ness . Requesting a Rx for scalp .

## 2023-09-21 RX ORDER — FLUOCINONIDE TOPICAL SOLUTION USP, 0.05% 0.5 MG/ML
SOLUTION TOPICAL
Qty: 60 ML | Refills: 1 | Status: SHIPPED | OUTPATIENT
Start: 2023-09-21

## 2023-09-25 ENCOUNTER — HOSPITAL ENCOUNTER (OUTPATIENT)
Dept: RADIOLOGY | Facility: MEDICAL CENTER | Age: 63
End: 2023-09-25
Attending: ORTHOPAEDIC SURGERY
Payer: COMMERCIAL

## 2023-09-25 DIAGNOSIS — M54.59 MECHANICAL LOW BACK PAIN: ICD-10-CM

## 2023-09-25 PROCEDURE — 72110 X-RAY EXAM L-2 SPINE 4/>VWS: CPT

## 2023-09-29 DIAGNOSIS — L24.0 IRRITANT CONTACT DERMATITIS DUE TO DETERGENT: ICD-10-CM

## 2023-09-29 NOTE — TELEPHONE ENCOUNTER
Received request via: Patient    Was the patient seen in the last year in this department? Yes    Does the patient have an active prescription (recently filled or refills available) for medication(s) requested? Yes    Pt called to get a refill on Triamcinolone. He is aware he had a 80g tube, but he applied it all over his body where there were symptoms. Pt states he will be out of it by Monday the 2nd, also stated that symptoms were improving.

## 2023-10-02 RX ORDER — TRIAMCINOLONE ACETONIDE 1 MG/G
1 CREAM TOPICAL 2 TIMES DAILY
Qty: 80 G | Refills: 0 | Status: ON HOLD | OUTPATIENT
Start: 2023-10-02 | End: 2023-12-11

## 2023-10-20 ENCOUNTER — HOSPITAL ENCOUNTER (OUTPATIENT)
Dept: RADIOLOGY | Facility: MEDICAL CENTER | Age: 63
End: 2023-10-20
Attending: ORTHOPAEDIC SURGERY
Payer: COMMERCIAL

## 2023-10-20 DIAGNOSIS — M54.50 LOW BACK PAIN, UNSPECIFIED BACK PAIN LATERALITY, UNSPECIFIED CHRONICITY, UNSPECIFIED WHETHER SCIATICA PRESENT: ICD-10-CM

## 2023-10-20 PROCEDURE — 72148 MRI LUMBAR SPINE W/O DYE: CPT

## 2023-11-16 ENCOUNTER — APPOINTMENT (OUTPATIENT)
Dept: ADMISSIONS | Facility: MEDICAL CENTER | Age: 63
End: 2023-11-16
Attending: ORTHOPAEDIC SURGERY
Payer: COMMERCIAL

## 2023-12-05 ENCOUNTER — PRE-ADMISSION TESTING (OUTPATIENT)
Dept: ADMISSIONS | Facility: MEDICAL CENTER | Age: 63
End: 2023-12-05
Attending: ORTHOPAEDIC SURGERY
Payer: COMMERCIAL

## 2023-12-05 ENCOUNTER — HOSPITAL ENCOUNTER (OUTPATIENT)
Dept: RADIOLOGY | Facility: MEDICAL CENTER | Age: 63
End: 2023-12-05
Attending: ORTHOPAEDIC SURGERY | Admitting: ORTHOPAEDIC SURGERY
Payer: COMMERCIAL

## 2023-12-05 DIAGNOSIS — Z01.810 PRE-OPERATIVE CARDIOVASCULAR EXAMINATION: ICD-10-CM

## 2023-12-05 DIAGNOSIS — Z01.811 PRE-OPERATIVE RESPIRATORY EXAMINATION: ICD-10-CM

## 2023-12-05 DIAGNOSIS — Z01.812 PRE-OPERATIVE LABORATORY EXAMINATION: ICD-10-CM

## 2023-12-05 LAB
25(OH)D3 SERPL-MCNC: 27 NG/ML (ref 30–100)
ANION GAP SERPL CALC-SCNC: 11 MMOL/L (ref 7–16)
APPEARANCE UR: CLEAR
APTT PPP: 28.8 SEC (ref 24.7–36)
BASOPHILS # BLD AUTO: 0.5 % (ref 0–1.8)
BASOPHILS # BLD: 0.02 K/UL (ref 0–0.12)
BILIRUB UR QL STRIP.AUTO: NEGATIVE
BUN SERPL-MCNC: 16 MG/DL (ref 8–22)
CALCIUM SERPL-MCNC: 10.1 MG/DL (ref 8.5–10.5)
CHLORIDE SERPL-SCNC: 101 MMOL/L (ref 96–112)
CO2 SERPL-SCNC: 26 MMOL/L (ref 20–33)
COLOR UR: YELLOW
CREAT SERPL-MCNC: 0.92 MG/DL (ref 0.5–1.4)
EKG IMPRESSION: NORMAL
EOSINOPHIL # BLD AUTO: 0.1 K/UL (ref 0–0.51)
EOSINOPHIL NFR BLD: 2.3 % (ref 0–6.9)
ERYTHROCYTE [DISTWIDTH] IN BLOOD BY AUTOMATED COUNT: 47.1 FL (ref 35.9–50)
GFR SERPLBLD CREATININE-BSD FMLA CKD-EPI: 93 ML/MIN/1.73 M 2
GLUCOSE SERPL-MCNC: 99 MG/DL (ref 65–99)
GLUCOSE UR STRIP.AUTO-MCNC: NEGATIVE MG/DL
HCT VFR BLD AUTO: 47.1 % (ref 42–52)
HGB BLD-MCNC: 16.3 G/DL (ref 14–18)
IMM GRANULOCYTES # BLD AUTO: 0.01 K/UL (ref 0–0.11)
IMM GRANULOCYTES NFR BLD AUTO: 0.2 % (ref 0–0.9)
INR PPP: 1.03 (ref 0.87–1.13)
KETONES UR STRIP.AUTO-MCNC: NEGATIVE MG/DL
LEUKOCYTE ESTERASE UR QL STRIP.AUTO: NEGATIVE
LYMPHOCYTES # BLD AUTO: 1.77 K/UL (ref 1–4.8)
LYMPHOCYTES NFR BLD: 40 % (ref 22–41)
MCH RBC QN AUTO: 33.2 PG (ref 27–33)
MCHC RBC AUTO-ENTMCNC: 34.6 G/DL (ref 32.3–36.5)
MCV RBC AUTO: 95.9 FL (ref 81.4–97.8)
MICRO URNS: NORMAL
MONOCYTES # BLD AUTO: 0.58 K/UL (ref 0–0.85)
MONOCYTES NFR BLD AUTO: 13.1 % (ref 0–13.4)
NEUTROPHILS # BLD AUTO: 1.94 K/UL (ref 1.82–7.42)
NEUTROPHILS NFR BLD: 43.9 % (ref 44–72)
NITRITE UR QL STRIP.AUTO: NEGATIVE
NRBC # BLD AUTO: 0 K/UL
NRBC BLD-RTO: 0 /100 WBC (ref 0–0.2)
PH UR STRIP.AUTO: 6 [PH] (ref 5–8)
PLATELET # BLD AUTO: 282 K/UL (ref 164–446)
PMV BLD AUTO: 10.3 FL (ref 9–12.9)
POTASSIUM SERPL-SCNC: 4.5 MMOL/L (ref 3.6–5.5)
PROT UR QL STRIP: NEGATIVE MG/DL
PROTHROMBIN TIME: 13.6 SEC (ref 12–14.6)
RBC # BLD AUTO: 4.91 M/UL (ref 4.7–6.1)
RBC UR QL AUTO: NEGATIVE
SODIUM SERPL-SCNC: 138 MMOL/L (ref 135–145)
SP GR UR STRIP.AUTO: 1.01
UROBILINOGEN UR STRIP.AUTO-MCNC: 0.2 MG/DL
WBC # BLD AUTO: 4.4 K/UL (ref 4.8–10.8)

## 2023-12-05 PROCEDURE — 80048 BASIC METABOLIC PNL TOTAL CA: CPT

## 2023-12-05 PROCEDURE — 82306 VITAMIN D 25 HYDROXY: CPT

## 2023-12-05 PROCEDURE — 85025 COMPLETE CBC W/AUTO DIFF WBC: CPT

## 2023-12-05 PROCEDURE — 36415 COLL VENOUS BLD VENIPUNCTURE: CPT

## 2023-12-05 PROCEDURE — 85610 PROTHROMBIN TIME: CPT

## 2023-12-05 PROCEDURE — 85730 THROMBOPLASTIN TIME PARTIAL: CPT

## 2023-12-05 PROCEDURE — 71046 X-RAY EXAM CHEST 2 VIEWS: CPT

## 2023-12-05 PROCEDURE — 93005 ELECTROCARDIOGRAM TRACING: CPT

## 2023-12-05 PROCEDURE — 81003 URINALYSIS AUTO W/O SCOPE: CPT

## 2023-12-05 PROCEDURE — 93010 ELECTROCARDIOGRAM REPORT: CPT | Performed by: INTERNAL MEDICINE

## 2023-12-07 ENCOUNTER — PRE-ADMISSION TESTING (OUTPATIENT)
Dept: ADMISSIONS | Facility: MEDICAL CENTER | Age: 63
End: 2023-12-07
Attending: ORTHOPAEDIC SURGERY
Payer: COMMERCIAL

## 2023-12-08 ENCOUNTER — ANESTHESIA EVENT (OUTPATIENT)
Dept: SURGERY | Facility: MEDICAL CENTER | Age: 63
End: 2023-12-08
Payer: COMMERCIAL

## 2023-12-11 ENCOUNTER — ANESTHESIA (OUTPATIENT)
Dept: SURGERY | Facility: MEDICAL CENTER | Age: 63
End: 2023-12-11
Payer: COMMERCIAL

## 2023-12-11 ENCOUNTER — APPOINTMENT (OUTPATIENT)
Dept: RADIOLOGY | Facility: MEDICAL CENTER | Age: 63
End: 2023-12-11
Attending: ORTHOPAEDIC SURGERY
Payer: COMMERCIAL

## 2023-12-11 ENCOUNTER — HOSPITAL ENCOUNTER (OUTPATIENT)
Facility: MEDICAL CENTER | Age: 63
End: 2023-12-12
Attending: ORTHOPAEDIC SURGERY | Admitting: ORTHOPAEDIC SURGERY
Payer: COMMERCIAL

## 2023-12-11 DIAGNOSIS — M48.062 LUMBAR STENOSIS WITH NEUROGENIC CLAUDICATION: ICD-10-CM

## 2023-12-11 PROCEDURE — 160041 HCHG SURGERY MINUTES - EA ADDL 1 MIN LEVEL 4: Performed by: ORTHOPAEDIC SURGERY

## 2023-12-11 PROCEDURE — A9270 NON-COVERED ITEM OR SERVICE: HCPCS | Performed by: ANESTHESIOLOGY

## 2023-12-11 PROCEDURE — G0378 HOSPITAL OBSERVATION PER HR: HCPCS

## 2023-12-11 PROCEDURE — 700102 HCHG RX REV CODE 250 W/ 637 OVERRIDE(OP): Performed by: ANESTHESIOLOGY

## 2023-12-11 PROCEDURE — 96367 TX/PROPH/DG ADDL SEQ IV INF: CPT

## 2023-12-11 PROCEDURE — 160036 HCHG PACU - EA ADDL 30 MINS PHASE I: Performed by: ORTHOPAEDIC SURGERY

## 2023-12-11 PROCEDURE — 700105 HCHG RX REV CODE 258: Performed by: ANESTHESIOLOGY

## 2023-12-11 PROCEDURE — 700101 HCHG RX REV CODE 250: Performed by: ANESTHESIOLOGY

## 2023-12-11 PROCEDURE — 700102 HCHG RX REV CODE 250 W/ 637 OVERRIDE(OP): Performed by: PHYSICIAN ASSISTANT

## 2023-12-11 PROCEDURE — 160029 HCHG SURGERY MINUTES - 1ST 30 MINS LEVEL 4: Performed by: ORTHOPAEDIC SURGERY

## 2023-12-11 PROCEDURE — 700101 HCHG RX REV CODE 250: Performed by: PHYSICIAN ASSISTANT

## 2023-12-11 PROCEDURE — 700105 HCHG RX REV CODE 258: Performed by: ORTHOPAEDIC SURGERY

## 2023-12-11 PROCEDURE — 160048 HCHG OR STATISTICAL LEVEL 1-5: Performed by: ORTHOPAEDIC SURGERY

## 2023-12-11 PROCEDURE — 700111 HCHG RX REV CODE 636 W/ 250 OVERRIDE (IP): Performed by: ANESTHESIOLOGY

## 2023-12-11 PROCEDURE — 160002 HCHG RECOVERY MINUTES (STAT): Performed by: ORTHOPAEDIC SURGERY

## 2023-12-11 PROCEDURE — 96365 THER/PROPH/DIAG IV INF INIT: CPT

## 2023-12-11 PROCEDURE — 700111 HCHG RX REV CODE 636 W/ 250 OVERRIDE (IP): Performed by: PHYSICIAN ASSISTANT

## 2023-12-11 PROCEDURE — 72100 X-RAY EXAM L-S SPINE 2/3 VWS: CPT

## 2023-12-11 PROCEDURE — A9270 NON-COVERED ITEM OR SERVICE: HCPCS | Performed by: PHYSICIAN ASSISTANT

## 2023-12-11 PROCEDURE — 160009 HCHG ANES TIME/MIN: Performed by: ORTHOPAEDIC SURGERY

## 2023-12-11 PROCEDURE — 160035 HCHG PACU - 1ST 60 MINS PHASE I: Performed by: ORTHOPAEDIC SURGERY

## 2023-12-11 PROCEDURE — 700105 HCHG RX REV CODE 258: Performed by: PHYSICIAN ASSISTANT

## 2023-12-11 RX ORDER — LABETALOL HYDROCHLORIDE 5 MG/ML
5 INJECTION, SOLUTION INTRAVENOUS
Status: DISCONTINUED | OUTPATIENT
Start: 2023-12-11 | End: 2023-12-11

## 2023-12-11 RX ORDER — ONDANSETRON 2 MG/ML
INJECTION INTRAMUSCULAR; INTRAVENOUS PRN
Status: DISCONTINUED | OUTPATIENT
Start: 2023-12-11 | End: 2023-12-11 | Stop reason: SURG

## 2023-12-11 RX ORDER — ROCURONIUM BROMIDE 10 MG/ML
INJECTION, SOLUTION INTRAVENOUS PRN
Status: DISCONTINUED | OUTPATIENT
Start: 2023-12-11 | End: 2023-12-11 | Stop reason: SURG

## 2023-12-11 RX ORDER — SODIUM CHLORIDE AND POTASSIUM CHLORIDE 150; 900 MG/100ML; MG/100ML
INJECTION, SOLUTION INTRAVENOUS CONTINUOUS
Status: DISCONTINUED | OUTPATIENT
Start: 2023-12-11 | End: 2023-12-12 | Stop reason: HOSPADM

## 2023-12-11 RX ORDER — ONDANSETRON 2 MG/ML
4 INJECTION INTRAMUSCULAR; INTRAVENOUS
Status: DISCONTINUED | OUTPATIENT
Start: 2023-12-11 | End: 2023-12-11 | Stop reason: HOSPADM

## 2023-12-11 RX ORDER — MIDAZOLAM HYDROCHLORIDE 1 MG/ML
INJECTION INTRAMUSCULAR; INTRAVENOUS PRN
Status: DISCONTINUED | OUTPATIENT
Start: 2023-12-11 | End: 2023-12-11 | Stop reason: SURG

## 2023-12-11 RX ORDER — OXYCODONE HCL 5 MG/5 ML
5 SOLUTION, ORAL ORAL
Status: COMPLETED | OUTPATIENT
Start: 2023-12-11 | End: 2023-12-11

## 2023-12-11 RX ORDER — ONDANSETRON 4 MG/1
4 TABLET, ORALLY DISINTEGRATING ORAL EVERY 4 HOURS PRN
Status: DISCONTINUED | OUTPATIENT
Start: 2023-12-11 | End: 2023-12-12 | Stop reason: HOSPADM

## 2023-12-11 RX ORDER — EPHEDRINE SULFATE 50 MG/ML
INJECTION, SOLUTION INTRAVENOUS PRN
Status: DISCONTINUED | OUTPATIENT
Start: 2023-12-11 | End: 2023-12-11 | Stop reason: SURG

## 2023-12-11 RX ORDER — AMOXICILLIN 250 MG
1 CAPSULE ORAL
Status: DISCONTINUED | OUTPATIENT
Start: 2023-12-11 | End: 2023-12-12 | Stop reason: HOSPADM

## 2023-12-11 RX ORDER — SODIUM CHLORIDE, SODIUM LACTATE, POTASSIUM CHLORIDE, CALCIUM CHLORIDE 600; 310; 30; 20 MG/100ML; MG/100ML; MG/100ML; MG/100ML
INJECTION, SOLUTION INTRAVENOUS CONTINUOUS
Status: ACTIVE | OUTPATIENT
Start: 2023-12-11 | End: 2023-12-11

## 2023-12-11 RX ORDER — PROCHLORPERAZINE EDISYLATE 5 MG/ML
5-10 INJECTION INTRAMUSCULAR; INTRAVENOUS EVERY 4 HOURS PRN
Status: DISCONTINUED | OUTPATIENT
Start: 2023-12-11 | End: 2023-12-12 | Stop reason: HOSPADM

## 2023-12-11 RX ORDER — HYDROMORPHONE HYDROCHLORIDE 1 MG/ML
0.1 INJECTION, SOLUTION INTRAMUSCULAR; INTRAVENOUS; SUBCUTANEOUS
Status: DISCONTINUED | OUTPATIENT
Start: 2023-12-11 | End: 2023-12-11 | Stop reason: HOSPADM

## 2023-12-11 RX ORDER — PHENYLEPHRINE HCL IN 0.9% NACL 0.5 MG/5ML
SYRINGE (ML) INTRAVENOUS PRN
Status: DISCONTINUED | OUTPATIENT
Start: 2023-12-11 | End: 2023-12-11 | Stop reason: SURG

## 2023-12-11 RX ORDER — MORPHINE SULFATE 4 MG/ML
1-2 INJECTION INTRAVENOUS
Status: DISCONTINUED | OUTPATIENT
Start: 2023-12-11 | End: 2023-12-12 | Stop reason: HOSPADM

## 2023-12-11 RX ORDER — KETOROLAC TROMETHAMINE 30 MG/ML
INJECTION, SOLUTION INTRAMUSCULAR; INTRAVENOUS PRN
Status: DISCONTINUED | OUTPATIENT
Start: 2023-12-11 | End: 2023-12-11 | Stop reason: SURG

## 2023-12-11 RX ORDER — SCOLOPAMINE TRANSDERMAL SYSTEM 1 MG/1
1 PATCH, EXTENDED RELEASE TRANSDERMAL
Status: DISCONTINUED | OUTPATIENT
Start: 2023-12-11 | End: 2023-12-12 | Stop reason: HOSPADM

## 2023-12-11 RX ORDER — CEFAZOLIN SODIUM 1 G/3ML
INJECTION, POWDER, FOR SOLUTION INTRAMUSCULAR; INTRAVENOUS PRN
Status: DISCONTINUED | OUTPATIENT
Start: 2023-12-11 | End: 2023-12-11 | Stop reason: SURG

## 2023-12-11 RX ORDER — OXYCODONE HCL 5 MG/5 ML
10 SOLUTION, ORAL ORAL
Status: COMPLETED | OUTPATIENT
Start: 2023-12-11 | End: 2023-12-11

## 2023-12-11 RX ORDER — PROMETHAZINE HYDROCHLORIDE 25 MG/1
12.5-25 TABLET ORAL EVERY 4 HOURS PRN
Status: DISCONTINUED | OUTPATIENT
Start: 2023-12-11 | End: 2023-12-12 | Stop reason: HOSPADM

## 2023-12-11 RX ORDER — HYDROCODONE BITARTRATE AND ACETAMINOPHEN 5; 325 MG/1; MG/1
1-2 TABLET ORAL EVERY 4 HOURS PRN
Status: DISCONTINUED | OUTPATIENT
Start: 2023-12-11 | End: 2023-12-12 | Stop reason: HOSPADM

## 2023-12-11 RX ORDER — HYDRALAZINE HYDROCHLORIDE 20 MG/ML
5 INJECTION INTRAMUSCULAR; INTRAVENOUS
Status: DISCONTINUED | OUTPATIENT
Start: 2023-12-11 | End: 2023-12-11

## 2023-12-11 RX ORDER — DIPHENHYDRAMINE HYDROCHLORIDE 50 MG/ML
12.5 INJECTION INTRAMUSCULAR; INTRAVENOUS
Status: DISCONTINUED | OUTPATIENT
Start: 2023-12-11 | End: 2023-12-11 | Stop reason: HOSPADM

## 2023-12-11 RX ORDER — DEXAMETHASONE SODIUM PHOSPHATE 4 MG/ML
INJECTION, SOLUTION INTRA-ARTICULAR; INTRALESIONAL; INTRAMUSCULAR; INTRAVENOUS; SOFT TISSUE PRN
Status: DISCONTINUED | OUTPATIENT
Start: 2023-12-11 | End: 2023-12-11 | Stop reason: SURG

## 2023-12-11 RX ORDER — PROMETHAZINE HYDROCHLORIDE 12.5 MG/1
12.5-25 SUPPOSITORY RECTAL EVERY 4 HOURS PRN
Status: DISCONTINUED | OUTPATIENT
Start: 2023-12-11 | End: 2023-12-12 | Stop reason: HOSPADM

## 2023-12-11 RX ORDER — MEPERIDINE HYDROCHLORIDE 25 MG/ML
6.25 INJECTION INTRAMUSCULAR; INTRAVENOUS; SUBCUTANEOUS
Status: DISCONTINUED | OUTPATIENT
Start: 2023-12-11 | End: 2023-12-11 | Stop reason: HOSPADM

## 2023-12-11 RX ORDER — ENEMA 19; 7 G/133ML; G/133ML
1 ENEMA RECTAL
Status: DISCONTINUED | OUTPATIENT
Start: 2023-12-11 | End: 2023-12-12 | Stop reason: HOSPADM

## 2023-12-11 RX ORDER — ACETAMINOPHEN 500 MG
1000 TABLET ORAL EVERY 6 HOURS
Status: DISCONTINUED | OUTPATIENT
Start: 2023-12-11 | End: 2023-12-12 | Stop reason: HOSPADM

## 2023-12-11 RX ORDER — HALOPERIDOL 5 MG/ML
1 INJECTION INTRAMUSCULAR
Status: DISCONTINUED | OUTPATIENT
Start: 2023-12-11 | End: 2023-12-11 | Stop reason: HOSPADM

## 2023-12-11 RX ORDER — BISACODYL 10 MG
10 SUPPOSITORY, RECTAL RECTAL
Status: DISCONTINUED | OUTPATIENT
Start: 2023-12-11 | End: 2023-12-12 | Stop reason: HOSPADM

## 2023-12-11 RX ORDER — EPHEDRINE SULFATE 50 MG/ML
5 INJECTION, SOLUTION INTRAVENOUS
Status: DISCONTINUED | OUTPATIENT
Start: 2023-12-11 | End: 2023-12-11 | Stop reason: HOSPADM

## 2023-12-11 RX ORDER — DOCUSATE SODIUM 100 MG/1
100 CAPSULE, LIQUID FILLED ORAL 2 TIMES DAILY
Status: DISCONTINUED | OUTPATIENT
Start: 2023-12-11 | End: 2023-12-12 | Stop reason: HOSPADM

## 2023-12-11 RX ORDER — HYDROMORPHONE HYDROCHLORIDE 1 MG/ML
0.4 INJECTION, SOLUTION INTRAMUSCULAR; INTRAVENOUS; SUBCUTANEOUS
Status: DISCONTINUED | OUTPATIENT
Start: 2023-12-11 | End: 2023-12-11 | Stop reason: HOSPADM

## 2023-12-11 RX ORDER — LIDOCAINE HYDROCHLORIDE 20 MG/ML
INJECTION, SOLUTION EPIDURAL; INFILTRATION; INTRACAUDAL; PERINEURAL PRN
Status: DISCONTINUED | OUTPATIENT
Start: 2023-12-11 | End: 2023-12-11 | Stop reason: SURG

## 2023-12-11 RX ORDER — ACETAMINOPHEN 500 MG
1000 TABLET ORAL ONCE
Status: COMPLETED | OUTPATIENT
Start: 2023-12-11 | End: 2023-12-11

## 2023-12-11 RX ORDER — POLYETHYLENE GLYCOL 3350 17 G/17G
1 POWDER, FOR SOLUTION ORAL 2 TIMES DAILY PRN
Status: DISCONTINUED | OUTPATIENT
Start: 2023-12-11 | End: 2023-12-12 | Stop reason: HOSPADM

## 2023-12-11 RX ORDER — METOCLOPRAMIDE HYDROCHLORIDE 5 MG/ML
INJECTION INTRAMUSCULAR; INTRAVENOUS PRN
Status: DISCONTINUED | OUTPATIENT
Start: 2023-12-11 | End: 2023-12-11 | Stop reason: SURG

## 2023-12-11 RX ORDER — HYDROMORPHONE HYDROCHLORIDE 1 MG/ML
0.2 INJECTION, SOLUTION INTRAMUSCULAR; INTRAVENOUS; SUBCUTANEOUS
Status: DISCONTINUED | OUTPATIENT
Start: 2023-12-11 | End: 2023-12-11 | Stop reason: HOSPADM

## 2023-12-11 RX ORDER — AMOXICILLIN 250 MG
1 CAPSULE ORAL NIGHTLY
Status: DISCONTINUED | OUTPATIENT
Start: 2023-12-11 | End: 2023-12-12 | Stop reason: HOSPADM

## 2023-12-11 RX ORDER — HYDROMORPHONE HYDROCHLORIDE 2 MG/ML
INJECTION, SOLUTION INTRAMUSCULAR; INTRAVENOUS; SUBCUTANEOUS PRN
Status: DISCONTINUED | OUTPATIENT
Start: 2023-12-11 | End: 2023-12-11 | Stop reason: SURG

## 2023-12-11 RX ORDER — CALCIUM CARBONATE 500 MG/1
500 TABLET, CHEWABLE ORAL 2 TIMES DAILY
Status: DISCONTINUED | OUTPATIENT
Start: 2023-12-11 | End: 2023-12-12 | Stop reason: HOSPADM

## 2023-12-11 RX ORDER — ACETAMINOPHEN 500 MG
1000 TABLET ORAL EVERY 6 HOURS PRN
Status: DISCONTINUED | OUTPATIENT
Start: 2023-12-16 | End: 2023-12-12 | Stop reason: HOSPADM

## 2023-12-11 RX ORDER — SODIUM CHLORIDE, SODIUM LACTATE, POTASSIUM CHLORIDE, CALCIUM CHLORIDE 600; 310; 30; 20 MG/100ML; MG/100ML; MG/100ML; MG/100ML
INJECTION, SOLUTION INTRAVENOUS CONTINUOUS
Status: DISCONTINUED | OUTPATIENT
Start: 2023-12-11 | End: 2023-12-11 | Stop reason: HOSPADM

## 2023-12-11 RX ORDER — ONDANSETRON 2 MG/ML
4 INJECTION INTRAMUSCULAR; INTRAVENOUS EVERY 4 HOURS PRN
Status: DISCONTINUED | OUTPATIENT
Start: 2023-12-11 | End: 2023-12-12 | Stop reason: HOSPADM

## 2023-12-11 RX ORDER — HYDRALAZINE HYDROCHLORIDE 20 MG/ML
10 INJECTION INTRAMUSCULAR; INTRAVENOUS
Status: DISCONTINUED | OUTPATIENT
Start: 2023-12-11 | End: 2023-12-12 | Stop reason: HOSPADM

## 2023-12-11 RX ORDER — LABETALOL HYDROCHLORIDE 5 MG/ML
10 INJECTION, SOLUTION INTRAVENOUS
Status: DISCONTINUED | OUTPATIENT
Start: 2023-12-11 | End: 2023-12-12 | Stop reason: HOSPADM

## 2023-12-11 RX ADMIN — METHOCARBAMOL 1000 MG: 100 INJECTION INTRAMUSCULAR; INTRAVENOUS at 18:19

## 2023-12-11 RX ADMIN — ACETAMINOPHEN 1000 MG: 500 TABLET ORAL at 10:42

## 2023-12-11 RX ADMIN — SUGAMMADEX 200 MG: 100 INJECTION, SOLUTION INTRAVENOUS at 14:41

## 2023-12-11 RX ADMIN — ACETAMINOPHEN 1000 MG: 500 TABLET, FILM COATED ORAL at 23:26

## 2023-12-11 RX ADMIN — SODIUM CHLORIDE, POTASSIUM CHLORIDE, SODIUM LACTATE AND CALCIUM CHLORIDE: 600; 310; 30; 20 INJECTION, SOLUTION INTRAVENOUS at 10:42

## 2023-12-11 RX ADMIN — METHOCARBAMOL 1000 MG: 100 INJECTION INTRAMUSCULAR; INTRAVENOUS at 15:33

## 2023-12-11 RX ADMIN — FENTANYL CITRATE 50 MCG: 50 INJECTION, SOLUTION INTRAMUSCULAR; INTRAVENOUS at 13:59

## 2023-12-11 RX ADMIN — HYDROMORPHONE HYDROCHLORIDE 0.2 MG: 2 INJECTION INTRAMUSCULAR; INTRAVENOUS; SUBCUTANEOUS at 14:15

## 2023-12-11 RX ADMIN — HYDROMORPHONE HYDROCHLORIDE 0.4 MG: 2 INJECTION INTRAMUSCULAR; INTRAVENOUS; SUBCUTANEOUS at 13:59

## 2023-12-11 RX ADMIN — FENTANYL CITRATE 50 MCG: 50 INJECTION, SOLUTION INTRAMUSCULAR; INTRAVENOUS at 13:27

## 2023-12-11 RX ADMIN — CEFAZOLIN 2 G: 1 INJECTION, POWDER, FOR SOLUTION INTRAMUSCULAR; INTRAVENOUS at 12:20

## 2023-12-11 RX ADMIN — CEFAZOLIN 2 G: 2 INJECTION, POWDER, FOR SOLUTION INTRAMUSCULAR; INTRAVENOUS at 17:44

## 2023-12-11 RX ADMIN — Medication 100 MCG: at 12:34

## 2023-12-11 RX ADMIN — FENTANYL CITRATE 100 MCG: 50 INJECTION, SOLUTION INTRAMUSCULAR; INTRAVENOUS at 12:09

## 2023-12-11 RX ADMIN — ACETAMINOPHEN 1000 MG: 500 TABLET, FILM COATED ORAL at 17:39

## 2023-12-11 RX ADMIN — KETOROLAC TROMETHAMINE 30 MG: 30 INJECTION, SOLUTION INTRAMUSCULAR; INTRAVENOUS at 14:37

## 2023-12-11 RX ADMIN — FENTANYL CITRATE 50 MCG: 50 INJECTION, SOLUTION INTRAMUSCULAR; INTRAVENOUS at 12:58

## 2023-12-11 RX ADMIN — DEXAMETHASONE SODIUM PHOSPHATE 8 MG: 4 INJECTION INTRA-ARTICULAR; INTRALESIONAL; INTRAMUSCULAR; INTRAVENOUS; SOFT TISSUE at 12:10

## 2023-12-11 RX ADMIN — HYDROMORPHONE HYDROCHLORIDE 0.4 MG: 1 INJECTION, SOLUTION INTRAMUSCULAR; INTRAVENOUS; SUBCUTANEOUS at 15:24

## 2023-12-11 RX ADMIN — DOCUSATE SODIUM 100 MG: 100 CAPSULE, LIQUID FILLED ORAL at 17:39

## 2023-12-11 RX ADMIN — LIDOCAINE HYDROCHLORIDE 100 MG: 20 INJECTION, SOLUTION EPIDURAL; INFILTRATION; INTRACAUDAL at 12:10

## 2023-12-11 RX ADMIN — SCOPOLAMINE 1 PATCH: 1.5 PATCH, EXTENDED RELEASE TRANSDERMAL at 10:42

## 2023-12-11 RX ADMIN — Medication 100 MCG: at 12:14

## 2023-12-11 RX ADMIN — HYDROMORPHONE HYDROCHLORIDE 0.4 MG: 2 INJECTION INTRAMUSCULAR; INTRAVENOUS; SUBCUTANEOUS at 14:41

## 2023-12-11 RX ADMIN — PROPOFOL 50 MG: 10 INJECTION, EMULSION INTRAVENOUS at 12:12

## 2023-12-11 RX ADMIN — POTASSIUM CHLORIDE AND SODIUM CHLORIDE: 900; 150 INJECTION, SOLUTION INTRAVENOUS at 20:33

## 2023-12-11 RX ADMIN — MIDAZOLAM HYDROCHLORIDE 1 MG: 1 INJECTION, SOLUTION INTRAMUSCULAR; INTRAVENOUS at 12:06

## 2023-12-11 RX ADMIN — OXYCODONE HYDROCHLORIDE 10 MG: 5 SOLUTION ORAL at 15:21

## 2023-12-11 RX ADMIN — METOCLOPRAMIDE 10 MG: 5 INJECTION, SOLUTION INTRAMUSCULAR; INTRAVENOUS at 12:10

## 2023-12-11 RX ADMIN — ONDANSETRON 4 MG: 2 INJECTION INTRAMUSCULAR; INTRAVENOUS at 14:10

## 2023-12-11 RX ADMIN — Medication 100 MCG: at 12:30

## 2023-12-11 RX ADMIN — EPHEDRINE SULFATE 5 MG: 50 INJECTION, SOLUTION INTRAVENOUS at 12:49

## 2023-12-11 RX ADMIN — ROCURONIUM BROMIDE 70 MG: 50 INJECTION, SOLUTION INTRAVENOUS at 12:10

## 2023-12-11 RX ADMIN — EPHEDRINE SULFATE 5 MG: 50 INJECTION, SOLUTION INTRAVENOUS at 12:53

## 2023-12-11 RX ADMIN — PROPOFOL 150 MG: 10 INJECTION, EMULSION INTRAVENOUS at 12:10

## 2023-12-11 ASSESSMENT — LIFESTYLE VARIABLES
EVER HAD A DRINK FIRST THING IN THE MORNING TO STEADY YOUR NERVES TO GET RID OF A HANGOVER: NO
ALCOHOL_USE: NO
TOTAL SCORE: 0
AVERAGE NUMBER OF DAYS PER WEEK YOU HAVE A DRINK CONTAINING ALCOHOL: 0
EVER FELT BAD OR GUILTY ABOUT YOUR DRINKING: NO
TOTAL SCORE: 0
ON A TYPICAL DAY WHEN YOU DRINK ALCOHOL HOW MANY DRINKS DO YOU HAVE: 0
DOES PATIENT WANT TO STOP DRINKING: NO
TOTAL SCORE: 0
HAVE PEOPLE ANNOYED YOU BY CRITICIZING YOUR DRINKING: NO
CONSUMPTION TOTAL: NEGATIVE
HOW MANY TIMES IN THE PAST YEAR HAVE YOU HAD 5 OR MORE DRINKS IN A DAY: 0
HAVE YOU EVER FELT YOU SHOULD CUT DOWN ON YOUR DRINKING: NO

## 2023-12-11 ASSESSMENT — PAIN DESCRIPTION - PAIN TYPE
TYPE: ACUTE PAIN
TYPE: SURGICAL PAIN
TYPE: SURGICAL PAIN
TYPE: ACUTE PAIN
TYPE: SURGICAL PAIN
TYPE: SURGICAL PAIN

## 2023-12-11 ASSESSMENT — FIBROSIS 4 INDEX
FIB4 SCORE: 1.12
FIB4 SCORE: 1.12

## 2023-12-11 ASSESSMENT — PATIENT HEALTH QUESTIONNAIRE - PHQ9
1. LITTLE INTEREST OR PLEASURE IN DOING THINGS: NOT AT ALL
2. FEELING DOWN, DEPRESSED, IRRITABLE, OR HOPELESS: NOT AT ALL
SUM OF ALL RESPONSES TO PHQ9 QUESTIONS 1 AND 2: 0

## 2023-12-11 NOTE — ANESTHESIA PREPROCEDURE EVALUATION
Case: [id number] Date/Time: 12/11/23 1115    Procedure: POSTERIOR LUMBAR BILATERAL L3 AND L4 LAMINOTOMIES POSSIBLE RIGHT L2 LAMINOTOMY WITH L3-4 AND L4-5 MICRODISCECTOMY AND L4-L5 DECOMPRESSION AND POSSIBLE L2-3    Pre-op diagnosis: LUMBAR STENOSIS    Location: [facility] OR 04 / SURGERY [facility]    Surgeons: Lucas Jarquin M.D.            Relevant Problems   PULMONARY (within normal limits)      NEURO   (negative) CVA (cerebral vascular accident) (HCC)   (negative) Seizures (HCC)   (negative) TIA (transient ischemic attack)      CARDIAC   (negative) CAD (coronary artery disease)   (negative) Dysrhythmia   (negative) MI (myocardial infarction) (HCC)      ENDO (within normal limits)      Other   (positive) Family history of malignant neoplasm of prostate   (positive) Hx of appendectomy   (positive) Obesity, unspecified   (positive) Post-procedural erectile dysfunction       Physical Exam    Airway   Mallampati: I  TM distance: >3 FB  Neck ROM: full       Cardiovascular - normal exam  Rhythm: regular  Rate: normal  (-) murmur     Dental - normal exam           Pulmonary - normal exam  Breath sounds clear to auscultation     Abdominal    Neurological - normal exam                   Anesthesia Plan    ASA 2       Plan - general       Airway plan will be ETT          Induction: intravenous    Postoperative Plan: Postoperative administration of opioids is intended.    Pertinent diagnostic labs and testing reviewed    Informed Consent:    Anesthetic plan and risks discussed with patient.    Use of blood products discussed with: patient whom consented to blood products.

## 2023-12-11 NOTE — OR SURGEON
ORTHO SPINE Immediate Post OP Note  [facility] [place]        PreOp Diagnosis:     1.  L3-L4 severe spinal canal stenosis  2.  L4-5 moderate to severe spinal canal stenosis  3.  Neurogenic claudication  4.  Chronic lumbar radiculopathy  5.  L3-L4 central disc herniation  6.  Lumbar spondylosis    PostOp Diagnosis:     1.  L3-L4 severe spinal canal stenosis  2.  L4-5 moderate to severe spinal canal stenosis  3.  Neurogenic claudication  4.  Chronic lumbar radiculopathy  5.  L3-L4 central disc herniation  6.  Lumbar spondylosis    Procedure(s):     POSTERIOR LUMBAR BILATERAL L3 LAMINOTOMIES  Posterior lumbar bilateral L4 hemilaminectomies  L3-4  MICRODISCECTOMY left-sided         Surgeon(s):  Lucas Jarquin M.D.    Assistant:        Efraín RAY    Anesthesiologist/Type of Anesthesia:  Anesthesiologist: Kanika Tran M.D./General    Surgical Staff:  Assistant: WILLARD Jansen  Circulator: Shauna Dent RALIYAH  Operating Room Assistant (ORA): STEVEN AKHTAR Scrub: Fahad Gerber; Sabrina Cantu, R.N.  Radiology Technologist: Sharon Menezes    Specimens removed if any:  * No specimens in log *    Estimated Blood Loss: Less than 300 cc    Findings: Severe spinal canal stenosis L3-L4 with disc herniation central    Complications: None        12/11/2023 2:37 PM Lucas Jarquin M.D.

## 2023-12-11 NOTE — DISCHARGE INSTRUCTIONS
HOME CARE INSTRUCTIONS    ACTIVITY: Rest and take it easy for the first 24 hours.  A responsible adult is recommended to remain with you during that time.  It is normal to feel sleepy.  We encourage you to not do anything that requires balance, judgment or coordination.    FOR 24 HOURS DO NOT:  Drive, operate machinery or run household appliances.  Drink beer or alcoholic beverages.  Make important decisions or sign legal documents.    SPECIAL INSTRUCTIONS: Laminectomy, Care After  The following information offers guidance on how to care for yourself after your procedure. Your health care provider may also give you more specific instructions. If you have problems or questions, contact your health care provider.  What can I expect after the procedure?  After the procedure, it is common to have:  Some pain around your incision area.  Muscle tightening (spasms) across the back.  Follow these instructions at home:  Medicines  Take over-the-counter and prescription medicines only as told by your health care provider.  If you were prescribed antibiotics, use them as told by your health care provider. Do not stop using the antibiotic even if you start to feel better.  If you are taking blood thinners:  Talk with your health care provider before you take any medicines that contain aspirin or NSAIDs, such as ibuprofen. These medicines increase your risk for dangerous bleeding.  Take your medicine exactly as told, at the same time every day.  Avoid activities that could cause injury or bruising, and follow instructions about how to prevent falls.  Wear a medical alert bracelet or carry a card that lists what medicines you take.  Ask your health care provider if the medicine prescribed to you:  Requires you to avoid driving or using machinery.  Can cause constipation. You may need to take these actions to prevent or treat constipation:  Drink enough fluid to keep your urine pale yellow.  Take over-the-counter or prescription  medicines.  Eat foods that are high in fiber, such as beans, whole grains, and fresh fruits and vegetables.  Limit foods that are high in fat and processed sugars, such as fried or sweet foods.  Do not drink alcohol if you are taking prescription pain medicine.  Incision care    Follow instructions from your health care provider about how to take care of your incision. Make sure you:  Wash your hands with soap and water for at least 20 seconds before and after you change your bandage (dressing). If soap and water are not available, use hand .  Change your dressing as told by your health care provider.  Leave stitches (sutures), skin glue, or adhesive strips in place. These skin closures may need to stay in place for 2 weeks or longer. If adhesive strip edges start to loosen and curl up, you may trim the loose edges. Do not remove adhesive strips completely unless your health care provider tells you to do that.  Check your incision area every day for signs of infection. Check for:  More redness, swelling, or pain.  Fluid or blood.  Warmth.  Pus or a bad smell.  Activity    Rest as told by your health care provider.  Avoid bending or twisting at your waist. Always bend at your knees.  Do not sit for a long time without moving. Get up to take short walks every 1-2 hours. This will improve blood flow and breathing. Ask for help if you feel weak or unsteady.  You may have to avoid lifting. Ask your health care provider how much you can safely lift.  Do exercises as told by your health care provider.  Return to your normal activities as told by your health care provider. Ask your health care provider what activities are safe for you.  General instructions    Do not use any products that contain nicotine or tobacco. These products include cigarettes, chewing tobacco, and vaping devices, such as e-cigarettes. These can delay bone healing after surgery. If you need help quitting, ask your health care provider.  Do  not take baths, swim, or use a hot tub until your health care provider approves. Ask your health care provider if you may take showers. You may only be allowed to take sponge baths.  Do not drive for 2 weeks after your procedure or for as long as told by your health care provider.  Wear compression stockings as told by your health care provider. These stockings help to prevent blood clots and reduce swelling in your legs.  Keep all follow-up visits. This is important so that your health care provider can monitor your healing and symptom improvement.  Contact a health care provider if:  You are not able to return to activities or do exercises as told by your health care provider.  You have any of these signs of infection:  Chills or a fever.  More redness, swelling, or pain around your incision.  Warmth at your incision area.  Fluid or blood coming from your incision.  Pus or a bad smell coming from your incision.  You develop a rash.  Get help right away if:  You feel dizzy or you faint while standing.  You develop shortness of breath or have difficulty breathing.  You cannot control when you urinate or have a bowel movement.  You become weak.  You are not able to use your legs.  These symptoms may be an emergency. Get help right away. Call 911.  Do not wait to see if the symptoms will go away.  Do not drive yourself to the hospital.  Summary  After the procedure, it is common to have some pain around your incision area or muscle tightening (spasms) across the back.  Follow instructions from your health care provider about how to care for your incision area.  You may have to avoid lifting. Ask your health care provider how much you can safely lift.  Contact your health care provider if you have signs of infection, such as more redness, swelling, or pain around your incision or if your incision feels warm to the touch.  This information is not intended to replace advice given to you by your health care provider. Make  sure you discuss any questions you have with your health care provider.  Document Revised: 03/29/2023 Document Reviewed: 03/29/2023  [employer] Patient Education © 2023 [employer]      DIET: To avoid nausea, slowly advance diet as tolerated, avoiding spicy or greasy foods for the first day.  Add more substantial food to your diet according to your physician's instructions.  Babies can be fed formula or breast milk as soon as they are hungry.  INCREASE FLUIDS AND FIBER TO AVOID CONSTIPATION.    SURGICAL DRESSING/BATHING: ___    MEDICATIONS: Resume taking daily medication.  Take prescribed pain medication with food.  If no medication is prescribed, you may take non-aspirin pain medication if needed.  PAIN MEDICATION CAN BE VERY CONSTIPATING.  Take a stool softener or laxative such as senokot, pericolace, or milk of magnesia if needed.    Prescription given for ___.  Last pain medication given at ___.    A follow-up appointment should be arranged with your doctor in 1-2 weeks; call to schedule.    You should CALL YOUR PHYSICIAN if you develop:  Fever greater than 101 degrees F.  Pain not relieved by medication, or persistent nausea or vomiting.  Excessive bleeding (blood soaking through dressing) or unexpected drainage from the wound.  Extreme redness or swelling around the incision site, drainage of pus or foul smelling drainage.  Inability to urinate or empty your bladder within 8 hours.  Problems with breathing or chest pain.    You should call 911 if you develop problems with breathing or chest pain.  If you are unable to contact your doctor or surgical center, you should go to the nearest emergency room or urgent care center.  Physician's telephone #: [phone]    MILD FLU-LIKE SYMPTOMS ARE NORMAL.  YOU MAY EXPERIENCE GENERALIZED MUSCLE ACHES, THROAT IRRITATION, HEADACHE AND/OR SOME NAUSEA.    If any questions arise, call your doctor.  If your doctor is not available, please feel free to call the Surgical  [place] at [phone].  The Center is open Monday through Friday from 7AM to 7PM.      A registered nurse may call you a few days after your surgery to see how you are doing after your procedure.    You may also receive a survey in the mail within the next two weeks and we ask that you take a few moments to complete the survey and return it to us.  Our goal is to provide you with very good care and we value your comments.     Depression / Suicide Risk    As you are discharged from this [facility] facility, it is important to learn how to keep safe from harming yourself.    Recognize the warning signs:  Abrupt changes in personality, positive or negative- including increase in energy   Giving away possessions  Change in eating patterns- significant weight changes-  positive or negative  Change in sleeping patterns- unable to sleep or sleeping all the time   Unwillingness or inability to communicate  Depression  Unusual sadness, discouragement and loneliness  Talk of wanting to die  Neglect of personal appearance   Rebelliousness- reckless behavior  Withdrawal from people/activities they love  Confusion- inability to concentrate     If you or a loved one observes any of these behaviors or has concerns about self-harm, here's what you can do:  Talk about it- your feelings and reasons for harming yourself  Remove any means that you might use to hurt yourself (examples: pills, rope, extension cords, firearm)  Get professional help from the community (Mental Health, Substance Abuse, psychological counseling)  Do not be alone:Call your Safe Contact- someone whom you trust who will be there for you.  Call your local CRISIS HOTLINE 128-6742 or 030-636-7911  Call your local Children's Mobile Crisis Response Team [place] (357) 414-7153 or www.[employer]  Call the toll free National Suicide Prevention Hotlines   National Suicide Prevention Lifeline 012-366-TUUC (9362)  National Hope Line Network 800-BZMYBNP  (836-9847)    I acknowledge receipt and understanding of these Home Care instructions.

## 2023-12-11 NOTE — ANESTHESIA PROCEDURE NOTES
Airway    Date/Time: 12/11/2023 12:12 PM    Performed by: Kanika Tran M.D.  Authorized by: Kanika Tran M.D.    Location:  OR  Urgency:  Elective  Indications for Airway Management:  Anesthesia      Spontaneous Ventilation: absent    Sedation Level:  Deep  Preoxygenated: Yes    Patient Position:  Sniffing  Final Airway Type:  Endotracheal airway  Final Endotracheal Airway:  ETT  Cuffed: Yes    Technique Used for Successful ETT Placement:  Direct laryngoscopy    Insertion Site:  Oral  Blade Type:  Darcy  Laryngoscope Blade/Videolaryngoscope Blade Size:  3  ETT Size (mm):  7.0  Measured from:  Teeth  ETT to Teeth (cm):  23  Placement Verified by: auscultation and capnometry    Cormack-Lehane Classification:  Grade I - full view of glottis  Number of Attempts at Approach:  1

## 2023-12-11 NOTE — ANESTHESIA TIME REPORT
Anesthesia Start and Stop Event Times       Date Time Event    12/11/2023 1142 Ready for Procedure     1204 Anesthesia Start     1454 Anesthesia Stop          Responsible Staff  12/11/23      Name Role Begin End    Kanika Tran M.D. Anesth 1204 1459          Overtime Reason:  no overtime (within assigned shift)    Comments:

## 2023-12-11 NOTE — OP REPORT
ORTHO SPINE OPERATIVE REPORT  [facility] [place]      PREOPERATIVE DIAGNOSIS:    1.  L3-L4 severe spinal canal stenosis  2.  L4-5 moderate to severe spinal canal stenosis  3.  Neurogenic claudication  4.  Chronic lumbar radiculopathy  5.  L3-L4 central disc herniation  6.  Lumbar spondylosis    POSTOPERATIVE DIAGNOSIS:    1.  L3-L4 severe spinal canal stenosis  2.  L4-5 moderate to severe spinal canal stenosis  3.  Neurogenic claudication  4.  Chronic lumbar radiculopathy  5.  L3-L4 central disc herniation  6.  Lumbar spondylosis    PROCEDURE:    Bilateral L3 laminotomies  Bilateral L4 hemilaminectomies  Left-sided L3-L4 microdiscectomy    Surgeon:     Lucas Jarquin MD    Assistant:     Efraín RAY    Anesthesia:    General via endotracheal tube    Estimated blood loss:      Less than 300 cc    Intravenous fluids:         1000 cc lactated Ringer's    Complications:       None    Indications:     Mr. Alex rene is a 63-year-old gentleman with a past medical history significant for left carpal tunnel release, umbilical hernia repair, bilateral inguinal hernia repairs, appendectomy and prostatectomy.    This patient presents to clinic with complaints of chronic low back pain that is progressive.  Complains of chronic pain down bilateral lower extremities.  He describes symptoms consistent with neurogenic claudication.  He describes symptoms consistent with lumbar radiculopathy.    Patient has noticed a decrease in his ability to perform activities.    He states anytime he was carrying any objects or standing for any length of time he would develop this pain.    His wife states that the pain noted a significant worsening approximately 2 years ago and has progressed since then.    The patient underwent MRI and clinical evaluation.  This revealed the patient have severe spinal canal stenosis at L3-L4.  He is found to have a central disc herniation at L3-L4.  He was found to have moderate to severe  spinal canal stenosis at L4-L5.    He had no signs of gross instability.    The patient was deemed a surgical candidate after failure of conservative measures.    He was recommended to undergo a bilateral L3 and bilateral L4 laminotomies with possible microdiscectomy of L3-L4.    We discussed the risk, the benefits the alternatives associate with operative treatment.  He voiced understanding.  He wished to proceed.  Signed consent was obtained.    We had a long discussion with regard to realistic expectations.    Operative technique:    The patient was taken the operative suite.  General anesthesia was induced and endotracheal tube.    The patient was positioned prone on the Jong table.  Bony prominences were well-padded using a crate foam.  Bilateral PAS boots were utilized operational throughout the case.    Lumbar spine was cleansed thoroughly using rubbing alcohol.  The lumbar spine was cleansed thoroughly using Betadine paint.  A full Betadine scrub was then performed.  This followed by DuraPrep and Eveban.    A timeout was called.  The patient did receive preoperative antibiotics consisting of Ancef 2 g given IV.    A spinal needle was placed at the anticipated level of the incision.  An intraoperative x-ray was obtained to confirm proper localization.  Based on our interpretation this spinal needle was at the L4 pedicle.    A midline skin incision was then created directly at the midline.  This was created using a 10 blade scalpel.  Sharp dissection was carried down through the dermis.  Hemostasis controlled using Bovie cautery.  Subcutaneous tissues were dissected in line with the skin incision down to the thoracolumbar fascia.  The thoracolumbar fascia was incised in line with the skin incision.  Subperiosteal dissection was carried down lateral aspect of the spinous process of L3 and spinous process of L4 and spinous process of L5.    Care was taken to maintain the integrity of the facet joint capsule at  L3-L4 and L4-L5.  Complete exposure of the L3 and L4 hemilamina on the left side was obtained.  A Sofie retractor was placed.    A Midas Frank bur was then and 35 it was then used to thin the caudal extent of the L3 and L4 hemilamina.  This allowed us to utilize an angled O curette to take down the hypertrophied ligamentum flavum from the ventral surface of the L3 and L4 hemilamina.  By doing so we were able to utilize a 4 mm Kerrison to begin the laminotomy portion.    The L4 laminotomy ultimately was converted into an L4 hemilaminectomy.    The thecal sac was completely exposed.  The hypertrophied ligamentum flavum removed.  The epidural lipomatosis removed.    We then protected the thecal sac with half by half and one by one cottonoids.  We were reached across midline and took down the ventral aspect of the spinous process and began the decompression on the contralateral side.  Decompressing out to the medial    Wall of the pedicle at L3 and L4.    The thecal sac was completely decompressed.    We then utilized a nerve root retractor and retracted the shoulder of the traversing L4 nerve root and the thecal sac medially.  This exposed the disc herniation at L3-L4.  The initial fibers of the annulus were pierced with a Penfield 4.  Disc fragment material was then removed using a pituitary rongeur.  The annulotomy created by the Penfield 4 was then enlarged with a 15 blade scalpel on the large handle..  A microdiscectomy at L3-L4 on the left side was performed.    Hemostasis was controlled using bipolar cautery and Gelfoam with thrombin.    The wound was irrigated with copious amounts of normal saline.  The wound was irrigated with copious amounts of normal saline mixed with Betadine.    A medium Hemovac drain was then placed deep to the thoracolumbar fascia juxtaposed next to the thecal sac.    The wound was then closed in a layered fashion using 0 Vicryl 2-0 Vicryl and 3-0 Monocryl.  A sterile dressing consisting  of benzoin both Steri-Strips and fluffs was then placed.  The patient was extubated and taken recovery room in stable condition.    He did receive 1 dose of Toradol 30 mg given IV at the end of the case.    There were no complications.    Patient tolerated the procedure well.

## 2023-12-12 VITALS
HEART RATE: 47 BPM | SYSTOLIC BLOOD PRESSURE: 117 MMHG | BODY MASS INDEX: 26.19 KG/M2 | HEIGHT: 69 IN | DIASTOLIC BLOOD PRESSURE: 52 MMHG | RESPIRATION RATE: 18 BRPM | WEIGHT: 176.81 LBS | TEMPERATURE: 97.7 F | OXYGEN SATURATION: 94 %

## 2023-12-12 PROCEDURE — 700111 HCHG RX REV CODE 636 W/ 250 OVERRIDE (IP): Performed by: PHYSICIAN ASSISTANT

## 2023-12-12 PROCEDURE — 97161 PT EVAL LOW COMPLEX 20 MIN: CPT

## 2023-12-12 PROCEDURE — 700105 HCHG RX REV CODE 258: Performed by: PHYSICIAN ASSISTANT

## 2023-12-12 PROCEDURE — 97535 SELF CARE MNGMENT TRAINING: CPT

## 2023-12-12 PROCEDURE — 700102 HCHG RX REV CODE 250 W/ 637 OVERRIDE(OP): Performed by: PHYSICIAN ASSISTANT

## 2023-12-12 PROCEDURE — G0378 HOSPITAL OBSERVATION PER HR: HCPCS

## 2023-12-12 PROCEDURE — A9270 NON-COVERED ITEM OR SERVICE: HCPCS | Performed by: PHYSICIAN ASSISTANT

## 2023-12-12 RX ORDER — HYDROCODONE BITARTRATE AND ACETAMINOPHEN 5; 325 MG/1; MG/1
1 TABLET ORAL EVERY 4 HOURS PRN
Qty: 20 TABLET | Refills: 0 | Status: SHIPPED | OUTPATIENT
Start: 2023-12-12 | End: 2023-12-22

## 2023-12-12 RX ADMIN — DOCUSATE SODIUM 100 MG: 100 CAPSULE, LIQUID FILLED ORAL at 05:31

## 2023-12-12 RX ADMIN — HYDROCODONE BITARTRATE AND ACETAMINOPHEN 2 TABLET: 5; 325 TABLET ORAL at 10:47

## 2023-12-12 RX ADMIN — ACETAMINOPHEN 1000 MG: 500 TABLET, FILM COATED ORAL at 05:31

## 2023-12-12 RX ADMIN — CEFAZOLIN 2 G: 2 INJECTION, POWDER, FOR SOLUTION INTRAMUSCULAR; INTRAVENOUS at 01:09

## 2023-12-12 ASSESSMENT — COGNITIVE AND FUNCTIONAL STATUS - GENERAL
SUGGESTED CMS G CODE MODIFIER MOBILITY: CI
MOVING TO AND FROM BED TO CHAIR: A LITTLE
MOBILITY SCORE: 23

## 2023-12-12 ASSESSMENT — GAIT ASSESSMENTS
DEVIATION: NO DEVIATION
GAIT LEVEL OF ASSIST: INDEPENDENT
DISTANCE (FEET): 1000

## 2023-12-12 ASSESSMENT — ACTIVITIES OF DAILY LIVING (ADL): TOILETING: INDEPENDENT

## 2023-12-12 NOTE — PROGRESS NOTES
ORTHO SPINE Progress Note  [facility] [place]          Date of Service: 2023    Chief Complaint  63 y.o. male admitted 2023 with severe spinal canal stenosis L3-L4 and L4-L5.    Patient had symptoms of neurogenic claudication as well as chronic lumbar radiculopathy.    He had a left-sided L3-L4 herniated nucleus pulposus.    Patient was taken the operative suite yesterday and underwent an elective L3-L4 and L4-L5 lumbar decompression which consisted of bilateral L3 laminotomies and bilateral L4 hemilaminectomies.  He also had a left-sided L3-L4 microdiscectomy.    Patient has done well through the night.    The nurse states that he has not taken any narcotic pain medication or any pain medicine through the night.    Patient complains of pain at the incision only.    He has been up walking several times.  He is walked in the hallways.    He is very pleased with his surgical result.    The patient's Hemovac has put out 50 cc initially and then 30 cc this morning at approximately 6 AM.        Interval Problem Update  Good improved    Consultants/Specialty  None    Disposition  Good        ROS   Physical Exam  Laboratory/Imaging   Hemodynamics  Temp (24hrs), Av.5 °C (97.7 °F), Min:36.2 °C (97.1 °F), Max:37 °C (98.6 °F)   Temperature: 36.5 °C (97.7 °F)  Pulse  Av.5  Min: 47  Max: 84    Blood Pressure: 117/52      Respiratory      Respiration: 18, Pulse Oximetry: 94 %             Fluids    Intake/Output Summary (Last 24 hours) at 2023 0700  Last data filed at 2023 0500  Gross per 24 hour   Intake 1213.33 ml   Output 80 ml   Net 1133.33 ml       Nutrition  Orders Placed This Encounter   Procedures    Diet Order Diet: Regular     Standing Status:   Standing     Number of Occurrences:   1     Order Specific Question:   Diet:     Answer:   Regular [1]     Physical Exam    General: Patient is a pleasant 63-year-old male who appears stated age.  He is resting comfortably.  He was  sleeping when I walked into the room.  He is laying on his right side.    Mental status: Patient is alert and oriented x 4.    Lungs: Bilateral chest expansion.  He has no active cough.  He is not labored in his breathing.  His respiratory rate appears normal.    Abdomen: Soft nontender nondistended.    Extremities:    Patient demonstrates 5 out of 5 muscle strength in bilateral lower extremities.  His calves are soft nontender.    Dressing is clean dry and intact.    Hemovac canister has scant drainage within it.    It is holding suction.                               Assessment/Plan     No new Assessment & Plan notes have been filed under this hospital service since the last note was generated.  Service: Surgery Orthopedic    Quality-Core Measures    1.  L3 bilateral laminotomies   POD #1  2.  L4 bilateral hemilaminectomies    POD #1  3.   L3-L4 left-sided microdiscectomy   POD #1  4.  Lumbar spondylosis    Plan:    Patient is doing well.  We will discharge him home today.    I would like the Hemovac to stay in place until he is discharged.    If he has any questions and concerns he is to call the office.    He was also provided with my personal cell phone number if he has any questions after hours.    Overall he is doing extremely well.

## 2023-12-12 NOTE — ANESTHESIA POSTPROCEDURE EVALUATION
Patient: Dipak Johnson    Procedure Summary       Date: 12/11/23 Room / Location: [facility] 04 / SURGERY [facility]    Anesthesia Start: 1204 Anesthesia Stop: 1454    Procedure: POSTERIOR LUMBAR BILATERAL L3 AND L4 LAMINOTOMIES; L3-4  MICRODISCECTOMY AND L4-L5 DECOMPRESSION (Right: Back) Diagnosis: (LUMBAR STENOSIS)    Surgeons: Lucas Jarquin M.D. Responsible Provider: Kanika Tran M.D.    Anesthesia Type: general ASA Status: 2            Final Anesthesia Type: general  Last vitals  BP   Blood Pressure: 130/63    Temp   36.8 °C (98.3 °F)    Pulse   (!) 56   Resp   16    SpO2   93 %      Anesthesia Post Evaluation    Patient location during evaluation: PACU  Patient participation: complete - patient participated  Level of consciousness: awake and alert    Airway patency: patent  Anesthetic complications: no  Cardiovascular status: hemodynamically stable  Respiratory status: acceptable  Hydration status: euvolemic    PONV: none          No notable events documented.     Nurse Pain Score: 3 (NPRS)

## 2023-12-12 NOTE — OR NURSING
Patient arrived in PACU, VSS. Back incision covered with mastisol, steri-strips, 4x4, medipore tape. Hemovac drain present and compressed.   Patient alert and oriented x4. Medicated for pain per MAR.   Patient eating ice chips with no complications. Updated wife, Leeann, about POC and expected room #.   Report called to YUNIOR Garcia. Transported patient to CDU T210 on room air with personal belongings from locker #46.  Included in report that patient is required to walk three times a day per Dr. Jarquin order.

## 2023-12-12 NOTE — PROGRESS NOTES
Drain from back incision  removed,20cc from drain,drain site looks intact,dc process explained to patient,pt looks comfortable,ambulating.

## 2023-12-12 NOTE — PROGRESS NOTES
4 Eyes Skin Assessment Completed by YUNIOR Painting and YUNIOR Ledesma.    Head WDL  Ears WDL  Nose WDL  Mouth WDL  Neck WDL  Breast/Chest WDL  Shoulder Blades WDL  Spine Incision  (R) Arm/Elbow/Hand WDL  (L) Arm/Elbow/Hand WDL  Abdomen Scar  Groin WDL  Scrotum/Coccyx/Buttocks WDL  (R) Leg WDL  (L) Leg WDL  (R) Heel/Foot/Toe WDL  (L) Heel/Foot/Toe WDL          Devices In Places Blood Pressure Cuff, Pulse Ox, and SCD's, hemovac      Interventions In Place Pillows    Possible Skin Injury No    Pictures Uploaded Into Epic N/A  Wound Consult Placed N/A  RN Wound Prevention Protocol Ordered No

## 2023-12-12 NOTE — CARE PLAN
The patient is Stable - Low risk of patient condition declining or worsening    Shift Goals  Clinical Goals: Monitor hemovac output, pain management, mobility  Patient Goals: comfort  Family Goals: not at bedside    Progress made toward(s) clinical / shift goals:    Problem: Pain - Standard  Goal: Alleviation of pain or a reduction in pain to the patient’s comfort goal  Description: Target End Date:  12/12/2023    Document on Vitals flowsheet    1.  Document pain using the appropriate pain scale per order or unit policy  2.  Educate and implement non-pharmacologic comfort measures (i.e. relaxation, distraction, massage, cold/heat therapy, etc.)  3.  Pain management medications as ordered  4.  Reassess pain after pain med administration per policy  5.  If opiods administered assess patient's response to pain medication is appropriate per POSS sedation scale  6.  Follow pain management plan developed in collaboration with patient and interdisciplinary team (including palliative care or pain specialists if applicable)  Outcome: Progressing     Problem: Knowledge Deficit - Standard  Goal: Patient and family/care givers will demonstrate understanding of plan of care, disease process/condition, diagnostic tests and medications  Description: Target End Date:  12/12/2023    Document in Patient Education    1.  Patient and family/caregiver oriented to unit, equipment, visitation policy and means for communicating concern  2.  Complete/review Learning Assessment  3.  Assess knowledge level of disease process/condition, treatment plan, diagnostic tests and medications  4.  Explain disease process/condition, treatment plan, diagnostic tests and medications  Outcome: Progressing

## 2023-12-12 NOTE — PROGRESS NOTES
Pt arrived to CDU with transport. Pt ambulated from gurney to bed with steady gait. Pt educated on room, call light and unit.

## 2023-12-12 NOTE — THERAPY
Physical Therapy   Initial Evaluation     Patient Name: Dipak Johnson  Age:  63 y.o., Sex:  male  Medical Record #: [id number]  Today's Date: 12/12/2023     Precautions  Precautions: Fall Risk;Spinal / Back Precautions   Comments: no brace    Assessment  Patient is 63 y.o. male presenting to physical therapy s/p L3 B laminotomies, L4 bilateral hemilaminectomies, and L3-4 L sided microdiscectomy due to lumbar spondylosis; no brace required. Pt demonstrated steady gait with no acute impairments. Tolerated ambulation multiple times around unit. Reviewed spinal precautions, goals for activity upon DC home, use of ice PRN for pain management and benefit of OP PT follow up.     Plan    No further acute PT needs    DC Equipment Recommendations: None  Discharge Recommendations: Recommend outpatient physical therapy services to address higher level deficits       12/12/23 1005   Precautions   Precautions Fall Risk;Spinal / Back Precautions    Comments no brace   Vitals   O2 Delivery Device None - Room Air   Pain 0 - 10 Group   Therapist Pain Assessment During Activity;Nurse Notified   Prior Living Situation   Prior Services Home-Independent   Housing / Facility 1 Story House   Steps Into Home 0   Steps In Home 0   Equipment Owned None   Lives with - Patient's Self Care Capacity Spouse   Prior Level of Functional Mobility   Bed Mobility Independent   Transfer Status Independent   Ambulation Independent   Ambulation Distance community   Assistive Devices Used None   Stairs Independent   Cognition    Cognition / Consciousness WDL   Comments pleasant and motivated to work with PT   Active ROM Lower Body    Active ROM Lower Body  WDL   Strength Lower Body   Lower Body Strength  WDL   Balance Assessment   Sitting Balance (Static) Good   Sitting Balance (Dynamic) Good   Standing Balance (Static) Fair +   Standing Balance (Dynamic) Fair +   Weight Shift Sitting Good   Weight Shift Standing Good   Comments w/o AD   Bed Mobility     Supine to Sit Standby Assist   Sit to Supine   (seated EOB at end of session)   Scooting Supervised   Comments OOB with HOB elevated and modified log roll. educated on spinal precautions and log roll OOB for pain and surgical mgmt   Gait Analysis   Gait Level Of Assist Independent   Assistive Device None   Distance (Feet) 1000   # of Times Distance was Traveled 1   Deviation No deviation   # of Stairs Climbed 0   Weight Bearing Status no restrictions   Comments steady gait wiht normal gait speed. no unilateral impairments or pain noted   Functional Mobility   Sit to Stand Supervised   Bed, Chair, Wheelchair Transfer Supervised   How much difficulty does the patient currently have...   Turning over in bed (including adjusting bedclothes, sheets and blankets)? 4   Sitting down on and standing up from a chair with arms (e.g., wheelchair, bedside commode, etc.) 4   Moving from lying on back to sitting on the side of the bed? 3   How much help from another person does the patient currently need...   Moving to and from a bed to a chair (including a wheelchair)? 4   Need to walk in a hospital room? 4   Climbing 3-5 steps with a railing? 4   6 clicks Mobility Score 23   Education Group   Education Provided Role of Physical Therapist;Spine Precautions   Spine Precautions Patient Response Patient;Acceptance;Explanation;Verbal Demonstration   Role of Physical Therapist Patient Response Patient;Acceptance;Explanation;Verbal Demonstration   Additional Comments role of OP PT, when to begin what to advocate for   Anticipated Discharge Equipment and Recommendations   DC Equipment Recommendations None   Discharge Recommendations Recommend outpatient physical therapy services to address higher level deficits

## 2024-01-08 DIAGNOSIS — C61 MALIGNANT NEOPLASM PROSTATE (HCC): ICD-10-CM

## 2024-01-09 ENCOUNTER — TELEPHONE (OUTPATIENT)
Dept: MEDICAL GROUP | Facility: IMAGING CENTER | Age: 64
End: 2024-01-09
Payer: COMMERCIAL

## 2024-01-09 NOTE — TELEPHONE ENCOUNTER
Pt came in said he got a call from you about radiation appt if you could give him a call pt said his appt 01/17

## 2024-01-11 ENCOUNTER — HOSPITAL ENCOUNTER (OUTPATIENT)
Dept: LAB | Facility: MEDICAL CENTER | Age: 64
End: 2024-01-11
Attending: RADIOLOGY
Payer: COMMERCIAL

## 2024-01-11 LAB — PSA SERPL-MCNC: 0.09 NG/ML (ref 0–4)

## 2024-01-11 PROCEDURE — 84153 ASSAY OF PSA TOTAL: CPT

## 2024-01-11 PROCEDURE — 36415 COLL VENOUS BLD VENIPUNCTURE: CPT

## 2024-02-01 ENCOUNTER — PATIENT MESSAGE (OUTPATIENT)
Dept: HEALTH INFORMATION MANAGEMENT | Facility: OTHER | Age: 64
End: 2024-02-01

## 2024-02-07 DIAGNOSIS — M48.062 LUMBAR STENOSIS WITH NEUROGENIC CLAUDICATION: ICD-10-CM

## 2024-02-07 RX ORDER — HYDROCODONE BITARTRATE AND ACETAMINOPHEN 5; 325 MG/1; MG/1
1 TABLET ORAL EVERY 4 HOURS PRN
Qty: 20 TABLET | Refills: 0 | OUTPATIENT
Start: 2024-02-07 | End: 2024-02-17

## 2024-02-07 NOTE — TELEPHONE ENCOUNTER
Covering for Sharron,    Please let pt know he needs to contact his pain specialist for [facility] refills.   Thanks

## 2024-02-07 NOTE — TELEPHONE ENCOUNTER
Received request via: Pharmacy    Was the patient seen in the last year in this department? Yes    Does the patient have an active prescription (recently filled or refills available) for medication(s) requested? No    Pharmacy Name: [facility] [address]    Does the patient have [facility] and need 100 day supply (blood pressure, diabetes and cholesterol meds only)? Patient does not have SCP

## 2024-02-13 DIAGNOSIS — L24.0 IRRITANT CONTACT DERMATITIS DUE TO DETERGENT: ICD-10-CM

## 2024-02-13 NOTE — TELEPHONE ENCOUNTER
Received request via: Pharmacy    Was the patient seen in the last year in this department? Yes    Does the patient have an active prescription (recently filled or refills available) for medication(s) requested? No    Pharmacy Name: [facility]     Does the patient have skilled nursing Plus and need 100 day supply (blood pressure, diabetes and cholesterol meds only)? Patient does not have SCP

## 2024-05-02 ENCOUNTER — OFFICE VISIT (OUTPATIENT)
Dept: MEDICAL GROUP | Facility: IMAGING CENTER | Age: 64
End: 2024-05-02
Payer: COMMERCIAL

## 2024-05-02 VITALS
TEMPERATURE: 97.4 F | OXYGEN SATURATION: 98 % | BODY MASS INDEX: 31.78 KG/M2 | SYSTOLIC BLOOD PRESSURE: 128 MMHG | RESPIRATION RATE: 14 BRPM | HEART RATE: 61 BPM | WEIGHT: 214.6 LBS | HEIGHT: 69 IN | DIASTOLIC BLOOD PRESSURE: 80 MMHG

## 2024-05-02 DIAGNOSIS — R45.89 ANXIETY ABOUT HEALTH: ICD-10-CM

## 2024-05-02 DIAGNOSIS — Z11.4 SCREENING FOR HIV (HUMAN IMMUNODEFICIENCY VIRUS): ICD-10-CM

## 2024-05-02 DIAGNOSIS — R00.2 PALPITATIONS: ICD-10-CM

## 2024-05-02 DIAGNOSIS — E78.00 ELEVATED LDL CHOLESTEROL LEVEL: ICD-10-CM

## 2024-05-02 DIAGNOSIS — E66.9 OBESITY (BMI 30-39.9): ICD-10-CM

## 2024-05-02 PROCEDURE — 3079F DIAST BP 80-89 MM HG: CPT

## 2024-05-02 PROCEDURE — 93000 ELECTROCARDIOGRAM COMPLETE: CPT

## 2024-05-02 PROCEDURE — 3074F SYST BP LT 130 MM HG: CPT

## 2024-05-02 PROCEDURE — 99214 OFFICE O/P EST MOD 30 MIN: CPT

## 2024-05-02 ASSESSMENT — ANXIETY QUESTIONNAIRES
6. BECOMING EASILY ANNOYED OR IRRITABLE: SEVERAL DAYS
4. TROUBLE RELAXING: NOT AT ALL
5. BEING SO RESTLESS THAT IT IS HARD TO SIT STILL: NOT AT ALL
GAD7 TOTAL SCORE: 6
7. FEELING AFRAID AS IF SOMETHING AWFUL MIGHT HAPPEN: NOT AT ALL
2. NOT BEING ABLE TO STOP OR CONTROL WORRYING: MORE THAN HALF THE DAYS
3. WORRYING TOO MUCH ABOUT DIFFERENT THINGS: SEVERAL DAYS
1. FEELING NERVOUS, ANXIOUS, OR ON EDGE: MORE THAN HALF THE DAYS

## 2024-05-02 ASSESSMENT — FIBROSIS 4 INDEX: FIB4 SCORE: 1.12

## 2024-05-02 ASSESSMENT — PATIENT HEALTH QUESTIONNAIRE - PHQ9: CLINICAL INTERPRETATION OF PHQ2 SCORE: 0

## 2024-05-02 NOTE — PROGRESS NOTES
"Subjective:     CC:   Chief Complaint   Patient presents with    Anxiety     X 3 weeks  Pt report his heart is flattering     Requesting Labs       HPI:   Dipak presents today to discuss:    Heart palpitations  Anxiety  New issue, onset of symptoms started 3 weeks ago. Symptoms include heart palpitations and anxiety. Pt reports \"hearing  his pulse at night which triggers his anxiety\"   He admits that anxiety is r/t to his recent health concerns. He's undergone prostate radiation and back surgery last year.   His palpitations would occur at random and last a few seconds. No alleviating or exacerbating factors identified.  He eats healthy and stays active. However, he admits not being able to exercise due to his surgeries.   Denies dizziness, chest pain, SOB, syncope.      EKG Interpretation  Interpreted by Sharron LI  Rhythm: normal sinus   Rate: normal and 50-60  Axis: normal  Ectopy: none  Conduction: normal  ST Segments: no acute change  T Waves: no acute change  Q Waves: none    Clinical Impression: no acute changes        5/2/2024    10:33 AM    CLYDE-7 ANXIETY SCALE FLOWSHEET   Feeling nervous, anxious, or on edge 2   Not being able to stop or control worrying 2   Worrying too much about different things 1   Trouble relaxing 0   Being so restless that it is hard to sit still 0   Becoming easily annoyed or irritable 1   Feeling afraid as if something awful might happen 0   CLYDE-7 Total Score 6       Interpretation of CLYDE-7 Total Score   Score Severity   0-4 Minimal Anxiety  5-9 Mild Anxiety   10-14 Moderate Anxiety  15-21 Severy Anxiety       Past Medical History:   Diagnosis Date    Anesthesia     Cancer (HCC) 2012    prostate    PONV (postoperative nausea and vomiting)     Prostate cancer (HCC)      History reviewed. No pertinent family history.  Past Surgical History:   Procedure Laterality Date    LUMBAR LAMINECTOMY DISKECTOMY Right 12/11/2023    Procedure: POSTERIOR LUMBAR BILATERAL L3 AND L4 " "LAMINOTOMIES; L3-4  MICRODISCECTOMY AND L4-L5 DECOMPRESSION;  Surgeon: Lucas Jarquin M.D.;  Location: SURGERY [facility];  Service: Neurosurgery    CA HERNIA REPAIR VENTRAL ANY APPROACH < 3CM REDUCIBLE N/A 6/16/2023    Procedure: ROBOTIC TRANSABDOMINAL PRE PERITONEAL INCISIONAL HERNIA REPAIR;  Surgeon: Abiel Sanz M.D.;  Location: SURGERY [facility];  Service: Gen Robotic    PROSTATECTOMY, RADICAL RETRO  2012    APPENDECTOMY      CARPAL TUNNEL RELEASE Left     HERNIA REPAIR      VASECTOMY       Social History     Tobacco Use    Smoking status: Never    Smokeless tobacco: Never   Vaping Use    Vaping Use: Never used   Substance Use Topics    Alcohol use: Not Currently    Drug use: Not Currently     Social History     Social History Narrative    Not on file     Current Outpatient Medications Ordered in Epic   Medication Sig Dispense Refill    fluocinonide (LIDEX) 0.05 % external solution AAA scalp BID PRN itching, rash. Do not use on face, axilla, groin. (Patient not taking: Reported on 5/2/2024) 60 mL 1     No current [facility]-ordered facility-administered medications on file.     Other misc    ROS: see hpi  Gen: no fevers/chills  Pulm: no sob, no cough  CV: no chest pain, no palpitations, no edema  GI: no nausea/vomiting, no diarrhea  Skin: no rash    Objective:   Exam:  /80 (BP Location: Left arm, Patient Position: Sitting, BP Cuff Size: Adult)   Pulse 61   Temp 36.3 °C (97.4 °F) (Temporal)   Resp 14   Ht 1.753 m (5' 9\")   Wt 97.3 kg (214 lb 9.6 oz)   SpO2 98%   BMI 31.69 kg/m²    Body mass index is 31.69 kg/m².    Gen: Alert and oriented, No apparent distress.  HEENT: Head atraumatic, normocephalic. Pupils equal and round.  Neck: Neck is supple without lymphadenopathy.   Lungs: Normal effort, CTA bilaterally, no wheezes, rhonchi, or rales  CV: Regular rate and rhythm. No murmurs, rubs, or gallops.  ABD: +BS. Non-tender, non-distended. No rebound, rigidity, or guarding.  Ext: No clubbing, " cyanosis, edema.    Assessment & Plan:     63 y.o. male with the following -     1. Anxiety about health  2. Palpitations  New, stable. VSS. EKG-NSR. Palpitations are likely related to anxiety about health.   Discussed treatment options for anxiety including pharmacotherapy, counseling and lifestyle interventions (sleep hygiene, reducing caffeine intake, meditation, breathing exercises, physical exercise).   Discussed mindfulness-based stress reduction, exercise, accupuncture treatments in addition to pharmacologic treatment options.  Recommend CALM MAGNESIUM supplementation.   Will order labs to r/o pathological cause.  ED symptoms discussed.     - EKG - Clinic Performed  - CBC WITH DIFFERENTIAL; Future  - Comp Metabolic Panel; Future  - Lipid Profile; Future  - TSH WITH REFLEX TO FT4; Future  - VITAMIN D,25 HYDROXY (DEFICIENCY); Future  - HEMOGLOBIN A1C; Future    3. Elevated LDL cholesterol level    - CT-CARDIAC SCORING; Future    4. Obesity (BMI 30-39.9)  Recommend lifestyle and dietary changes such as low-carb diet, high in vegetables and fresh fruits.  Encouraged to increase water intake.  Regular physical exercise at least 30 minutes/day 5 days a week. Weight reduction if applicable (aim for 5% to 10% body weight increments). Patient is at an increased risk for serious conditions such as heart disease, type 2 diabetes, YORDAN, certain types of cancers, gallbladder disease, and circulation problems. Will order labs to rule out.     - CBC WITH DIFFERENTIAL; Future  - Comp Metabolic Panel; Future  - Lipid Profile; Future  - TSH WITH REFLEX TO FT4; Future  - VITAMIN D,25 HYDROXY (DEFICIENCY); Future  - HEMOGLOBIN A1C; Future    5. Screening for HIV (human immunodeficiency virus)    - HIV AG/AB COMBO ASSAY SCREENING; Future    Medical Decision Making/Course:  In the course of preparing for this visit with review of the pertinent past medical history, recent and past clinic visits, current medications, and performing  chart, immunization, medical history and medication reconciliation, and in the further course of obtaining the current history pertinent to the clinic visit today, performing an exam and evaluation, ordering and independently evaluating labs, tests, and/or procedures, prescribing any recommended new medications as noted above, providing any pertinent counseling and education and recommending further coordination of care. This was discussed with patient in a shared-decision making conversation, and they understand and agreed with plan of care.     Return in about 2 weeks (around 5/16/2024), or if symptoms worsen or fail to improve, for f/u labs.    MAGALY Orellana   [facility]    Please note that this dictation was created using voice recognition software. I have made every reasonable attempt to correct obvious errors, but I expect that there are errors of grammar and possibly content that I did not discover before finalizing the note.

## 2024-05-07 ENCOUNTER — HOSPITAL ENCOUNTER (OUTPATIENT)
Dept: LAB | Facility: MEDICAL CENTER | Age: 64
End: 2024-05-07
Payer: COMMERCIAL

## 2024-05-07 DIAGNOSIS — R00.2 PALPITATIONS: ICD-10-CM

## 2024-05-07 DIAGNOSIS — E66.9 OBESITY (BMI 30-39.9): ICD-10-CM

## 2024-05-07 DIAGNOSIS — Z11.4 SCREENING FOR HIV (HUMAN IMMUNODEFICIENCY VIRUS): ICD-10-CM

## 2024-05-07 LAB
25(OH)D3 SERPL-MCNC: 18 NG/ML (ref 30–100)
ALBUMIN SERPL BCP-MCNC: 4.2 G/DL (ref 3.2–4.9)
ALBUMIN/GLOB SERPL: 1.4 G/DL
ALP SERPL-CCNC: 89 U/L (ref 30–99)
ALT SERPL-CCNC: 17 U/L (ref 2–50)
ANION GAP SERPL CALC-SCNC: 9 MMOL/L (ref 7–16)
AST SERPL-CCNC: 20 U/L (ref 12–45)
BASOPHILS # BLD AUTO: 0.2 % (ref 0–1.8)
BASOPHILS # BLD: 0.01 K/UL (ref 0–0.12)
BILIRUB SERPL-MCNC: 0.7 MG/DL (ref 0.1–1.5)
BUN SERPL-MCNC: 16 MG/DL (ref 8–22)
CALCIUM ALBUM COR SERPL-MCNC: 8.9 MG/DL (ref 8.5–10.5)
CALCIUM SERPL-MCNC: 9.1 MG/DL (ref 8.4–10.2)
CHLORIDE SERPL-SCNC: 105 MMOL/L (ref 96–112)
CHOLEST SERPL-MCNC: 180 MG/DL (ref 100–199)
CO2 SERPL-SCNC: 26 MMOL/L (ref 20–33)
CREAT SERPL-MCNC: 0.83 MG/DL (ref 0.5–1.4)
EOSINOPHIL # BLD AUTO: 0.05 K/UL (ref 0–0.51)
EOSINOPHIL NFR BLD: 1.2 % (ref 0–6.9)
ERYTHROCYTE [DISTWIDTH] IN BLOOD BY AUTOMATED COUNT: 46.4 FL (ref 35.9–50)
EST. AVERAGE GLUCOSE BLD GHB EST-MCNC: 123 MG/DL
FASTING STATUS PATIENT QL REPORTED: NORMAL
GFR SERPLBLD CREATININE-BSD FMLA CKD-EPI: 97 ML/MIN/1.73 M 2
GLOBULIN SER CALC-MCNC: 3 G/DL (ref 1.9–3.5)
GLUCOSE SERPL-MCNC: 109 MG/DL (ref 65–99)
HBA1C MFR BLD: 5.9 % (ref 4–5.6)
HCT VFR BLD AUTO: 46.3 % (ref 42–52)
HDLC SERPL-MCNC: 45 MG/DL
HGB BLD-MCNC: 15.9 G/DL (ref 14–18)
HIV 1+2 AB+HIV1 P24 AG SERPL QL IA: NORMAL
IMM GRANULOCYTES # BLD AUTO: 0.01 K/UL (ref 0–0.11)
IMM GRANULOCYTES NFR BLD AUTO: 0.2 % (ref 0–0.9)
LDLC SERPL CALC-MCNC: 113 MG/DL
LYMPHOCYTES # BLD AUTO: 1.33 K/UL (ref 1–4.8)
LYMPHOCYTES NFR BLD: 32.4 % (ref 22–41)
MCH RBC QN AUTO: 32.6 PG (ref 27–33)
MCHC RBC AUTO-ENTMCNC: 34.3 G/DL (ref 32.3–36.5)
MCV RBC AUTO: 95.1 FL (ref 81.4–97.8)
MONOCYTES # BLD AUTO: 0.51 K/UL (ref 0–0.85)
MONOCYTES NFR BLD AUTO: 12.4 % (ref 0–13.4)
NEUTROPHILS # BLD AUTO: 2.2 K/UL (ref 1.82–7.42)
NEUTROPHILS NFR BLD: 53.6 % (ref 44–72)
NRBC # BLD AUTO: 0 K/UL
NRBC BLD-RTO: 0 /100 WBC (ref 0–0.2)
PLATELET # BLD AUTO: 265 K/UL (ref 164–446)
PMV BLD AUTO: 9.4 FL (ref 9–12.9)
POTASSIUM SERPL-SCNC: 4.4 MMOL/L (ref 3.6–5.5)
PROT SERPL-MCNC: 7.2 G/DL (ref 6–8.2)
PSA SERPL-MCNC: 0.06 NG/ML (ref 0–4)
RBC # BLD AUTO: 4.87 M/UL (ref 4.7–6.1)
SODIUM SERPL-SCNC: 140 MMOL/L (ref 135–145)
TRIGL SERPL-MCNC: 108 MG/DL (ref 0–149)
TSH SERPL DL<=0.005 MIU/L-ACNC: 2.24 UIU/ML (ref 0.38–5.33)
WBC # BLD AUTO: 4.1 K/UL (ref 4.8–10.8)

## 2024-05-09 ENCOUNTER — HOSPITAL ENCOUNTER (OUTPATIENT)
Dept: RADIOLOGY | Facility: MEDICAL CENTER | Age: 64
End: 2024-05-09
Payer: COMMERCIAL

## 2024-05-09 ENCOUNTER — OFFICE VISIT (OUTPATIENT)
Dept: MEDICAL GROUP | Facility: IMAGING CENTER | Age: 64
End: 2024-05-09
Payer: COMMERCIAL

## 2024-05-09 VITALS
DIASTOLIC BLOOD PRESSURE: 78 MMHG | BODY MASS INDEX: 31.78 KG/M2 | WEIGHT: 214.6 LBS | HEIGHT: 69 IN | RESPIRATION RATE: 14 BRPM | OXYGEN SATURATION: 94 % | HEART RATE: 63 BPM | SYSTOLIC BLOOD PRESSURE: 118 MMHG | TEMPERATURE: 97.2 F

## 2024-05-09 DIAGNOSIS — E78.00 PURE HYPERCHOLESTEROLEMIA: ICD-10-CM

## 2024-05-09 DIAGNOSIS — E78.00 ELEVATED LDL CHOLESTEROL LEVEL: ICD-10-CM

## 2024-05-09 DIAGNOSIS — E55.9 VITAMIN D DEFICIENCY: ICD-10-CM

## 2024-05-09 DIAGNOSIS — N52.9 ERECTILE DYSFUNCTION, UNSPECIFIED ERECTILE DYSFUNCTION TYPE: ICD-10-CM

## 2024-05-09 DIAGNOSIS — L30.9 ECZEMA, UNSPECIFIED TYPE: ICD-10-CM

## 2024-05-09 DIAGNOSIS — R73.03 PREDIABETES: ICD-10-CM

## 2024-05-09 PROCEDURE — 3074F SYST BP LT 130 MM HG: CPT

## 2024-05-09 PROCEDURE — 99214 OFFICE O/P EST MOD 30 MIN: CPT

## 2024-05-09 PROCEDURE — 3078F DIAST BP <80 MM HG: CPT

## 2024-05-09 RX ORDER — TRIAMCINOLONE ACETONIDE 1 MG/G
1 CREAM TOPICAL 2 TIMES DAILY
Qty: 30 G | Refills: 6 | Status: SHIPPED | OUTPATIENT
Start: 2024-05-09

## 2024-05-09 RX ORDER — SILDENAFIL CITRATE 20 MG/1
20 TABLET ORAL PRN
Qty: 30 TABLET | Refills: 6 | Status: SHIPPED | OUTPATIENT
Start: 2024-05-09 | End: 2024-06-08

## 2024-05-09 RX ORDER — CHOLECALCIFEROL (VITAMIN D3) 1250 MCG
1 CAPSULE ORAL
Qty: 12 CAPSULE | Refills: 1 | Status: SHIPPED | OUTPATIENT
Start: 2024-05-09

## 2024-05-09 ASSESSMENT — FIBROSIS 4 INDEX: FIB4 SCORE: 1.17

## 2024-05-09 NOTE — PROGRESS NOTES
Subjective:     CC:   Chief Complaint   Patient presents with    Follow-Up     Lab results 5/7       HPI:   Dipak presents today to discuss:    Prediabetes  New finding.  He is working on lifestyle modification.  Patient denies polyuria, polyphagia, unexplained weight loss, fatigue, vision changes, frequent UTI, numbness or tingling in the extremities.     Latest Reference Range & Units 05/07/24 09:44   Glycohemoglobin 4.0 - 5.6 % 5.9 (H)   (H): Data is abnormally high    Pure hypercholesterolemia  Chronic condition.  Patient admits that he was not exercising as much due to his previous surgeries.  However, he has recovered and is motivated in restarting his exercise regimen.  Patient denies blurred blurred, severe headaches, chest pain, chest pressure, dizziness, palpitations, syncope, orthopnea, dyspnea or exertion, or leg swelling.      Latest Reference Range & Units 05/07/24 09:44   Cholesterol,Tot 100 - 199 mg/dL 180   Triglycerides 0 - 149 mg/dL 108   HDL >=40 mg/dL 45   LDL <100 mg/dL 113 (H)   (H): Data is abnormally high    Coronary calcification:  LMA - 0.0  LCX - 0.0  LAD - 0.0  RCA - 0.0  PDA - 0.0     Total Calcium Score: 0.0    Vitamin D deficiency  Established condition.  He started taking vitamin D supplement 2 days ago.     Latest Reference Range & Units 05/07/24 09:44   25-Hydroxy   Vitamin D 25 30 - 100 ng/mL 18 (L)   (L): Data is abnormally low      Past Medical History:   Diagnosis Date    Anesthesia     Cancer (HCC) 2012    prostate    PONV (postoperative nausea and vomiting)     Prostate cancer (HCC)      History reviewed. No pertinent family history.  Past Surgical History:   Procedure Laterality Date    LUMBAR LAMINECTOMY DISKECTOMY Right 12/11/2023    Procedure: POSTERIOR LUMBAR BILATERAL L3 AND L4 LAMINOTOMIES; L3-4  MICRODISCECTOMY AND L4-L5 DECOMPRESSION;  Surgeon: Lucas Jarquin M.D.;  Location: SURGERY [facility];  Service: Neurosurgery    [place] HERNIA REPAIR VENTRAL ANY APPROACH <  "3CM REDUCIBLE N/A 6/16/2023    Procedure: ROBOTIC TRANSABDOMINAL PRE PERITONEAL INCISIONAL HERNIA REPAIR;  Surgeon: Abiel Sanz M.D.;  Location: SURGERY [facility];  Service: Gen Robotic    PROSTATECTOMY, RADICAL RETRO  2012    APPENDECTOMY      CARPAL TUNNEL RELEASE Left     HERNIA REPAIR      VASECTOMY       Social History     Tobacco Use    Smoking status: Never    Smokeless tobacco: Never   Vaping Use    Vaping Use: Never used   Substance Use Topics    Alcohol use: Not Currently    Drug use: Not Currently     Social History     Social History Narrative    Not on file     Current Outpatient Medications Ordered in Epic   Medication Sig Dispense Refill    Cholecalciferol (VITAMIN D3) 1.25 MG (02532 UT) Cap Take 1 Capsule by mouth every 7 days. 12 Capsule 1    sildenafil (REVATIO) 20 MG tablet Take 1 Tablet by mouth as needed (low libido, ED) for up to 30 days. 30 Tablet 6    triamcinolone acetonide (KENALOG) 0.1 % Cream Apply 1 Application topically 2 times a day. 30 g 6     No current Epic-ordered facility-administered medications on file.     Other misc    ROS: see hpi  Gen: no fevers/chills  Pulm: no sob, no cough  CV: no chest pain, no palpitations, no edema  GI: no nausea/vomiting, no diarrhea  Skin: no rash    Objective:   Exam:  /78 (BP Location: Left arm, Patient Position: Sitting, BP Cuff Size: Adult)   Pulse 63   Temp 36.2 °C (97.2 °F) (Temporal)   Resp 14   Ht 1.753 m (5' 9\")   Wt 97.3 kg (214 lb 9.6 oz)   SpO2 94%   BMI 31.69 kg/m²    Body mass index is 31.69 kg/m².    Gen: Alert and oriented, No apparent distress.  HEENT: Head atraumatic, normocephalic. Pupils equal and round.  Neck: Neck is supple without lymphadenopathy.   Lungs: Normal effort, CTA bilaterally, no wheezes, rhonchi, or rales  CV: Regular rate and rhythm. No murmurs, rubs, or gallops.  ABD: +BS. Non-tender, non-distended. No rebound, rigidity, or guarding.  Ext: No clubbing, cyanosis, edema.    Assessment & Plan: "     63 y.o. male with the following -     1. Vitamin D deficiency  Chronic, uncontrolled condition.  Will treat with vitamin D3 50,000 IU weekly x 3 months.  Afterwards, he can resume daily supplementation of vitamin D3/K2  Will repeat labs to monitor.     - Cholecalciferol (VITAMIN D3) 1.25 MG (25054 UT) Cap; Take 1 Capsule by mouth every 7 days.  Dispense: 12 Capsule; Refill: 1  - VITAMIN D,25 HYDROXY (DEFICIENCY); Future    2. Pure hypercholesterolemia  Chronic, improving.  , goal less than 100.  Discussed The 10-year ASCVD risk score (Jose Alfredo MCDOWELL, et al., 2019) is: 10.2%  Cardiac calcium CT score 0.  Recommendations include lifestyle modification:  -healthy diet that is low in trans and saturated fat and high in fiber and potassium emphasizing on increasing fruits, vegetables, whole grains, poultry, fish and nuts (i.e. DASH diet/Mediterranean)  -reduce salt intake to no more than 2,400 mg/day.   -increase physical activity and start an exercise regimen. Adults should engage in at least 150 minutes/week of moderate-intensity or 75 minutes/week of vigorous-intensity physical activity including resistance exercise which has proven to lower risk of atherosclerotic cardiovascular disease (ASCVD).   -maintain normal BMI and blood pressure control.  -Start omega-3 fatty acid 1 g/day, increase fiber intake, and start probiotic daily supplementation  Discussed signs and symptoms of major cardiovascular event and need to present to ED.    - Lipid Profile; Future    3. Prediabetes  New finding. Patient counseled on lifestyle modification that include healthy diet, increase physical activity, and maintain normal BMI to help reduce her risk of progression to diabetes. Discussed diabetes sequala both in the microvascular and macrovascular level with patient. Will re- check HbA1c level in the 3 month-follow up. If fasting blood glucose and HA1c levels remain high, will refer to dietitian, diabetes education program, and  consider starting Metformin.    - HEMOGLOBIN A1C; Future    4. Eczema, unspecified type  Chronic, stable condition on triamcinolone as needed with flareups.  Med refill sent to pharmacy.  Medication administration and side effects discussed.    - triamcinolone acetonide (KENALOG) 0.1 % Cream; Apply 1 Application topically 2 times a day.  Dispense: 30 g; Refill: 6    5. Erectile dysfunction, unspecified erectile dysfunction type  Chronic, stable condition on sildenafil as needed.  Med refill sent to pharmacy.  Medication administration and side effects discussed.    - sildenafil (REVATIO) 20 MG tablet; Take 1 Tablet by mouth as needed (low libido, ED) for up to 30 days.  Dispense: 30 Tablet; Refill: 6    Medical Decision Making/Course:  In the course of preparing for this visit with review of the pertinent past medical history, recent and past clinic visits, current medications, and performing chart, immunization, medical history and medication reconciliation, and in the further course of obtaining the current history pertinent to the clinic visit today, performing an exam and evaluation, ordering and independently evaluating labs, tests, and/or procedures, prescribing any recommended new medications as noted above, providing any pertinent counseling and education and recommending further coordination of care. This was discussed with patient in a shared-decision making conversation, and they understand and agreed with plan of care.     Return in about 3 months (around 8/9/2024), or if symptoms worsen or fail to improve, for f/u labs.    BEVERLY Orellana.   [facility]    Please note that this dictation was created using voice recognition software. I have made every reasonable attempt to correct obvious errors, but I expect that there are errors of grammar and possibly content that I did not discover before finalizing the note.

## 2024-06-24 ENCOUNTER — APPOINTMENT (OUTPATIENT)
Dept: DERMATOLOGY | Facility: IMAGING CENTER | Age: 64
End: 2024-06-24
Payer: COMMERCIAL

## 2024-06-27 ENCOUNTER — OFFICE VISIT (OUTPATIENT)
Dept: DERMATOLOGY | Facility: IMAGING CENTER | Age: 64
End: 2024-06-27
Payer: COMMERCIAL

## 2024-06-27 DIAGNOSIS — D22.9 MULTIPLE NEVI: ICD-10-CM

## 2024-06-27 DIAGNOSIS — L91.8 ACROCHORDON: ICD-10-CM

## 2024-06-27 DIAGNOSIS — L73.8 SEBACEOUS HYPERPLASIA: ICD-10-CM

## 2024-06-27 DIAGNOSIS — Z12.83 SKIN CANCER SCREENING: ICD-10-CM

## 2024-06-27 DIAGNOSIS — L81.4 LENTIGINES: ICD-10-CM

## 2024-06-27 DIAGNOSIS — L82.1 SK (SEBORRHEIC KERATOSIS): ICD-10-CM

## 2024-06-27 DIAGNOSIS — D18.01 CHERRY ANGIOMA: ICD-10-CM

## 2024-06-27 PROCEDURE — 99212 OFFICE O/P EST SF 10 MIN: CPT | Performed by: NURSE PRACTITIONER

## 2024-06-27 NOTE — PROGRESS NOTES
DERMATOLOGY NOTE  FOLLOW UP VISIT       Chief complaint: Follow-Up (CAMRYN)  Pt would like some skin tags evaluated on rt axilla and left neck    History of skin cancer: No  History of precancers/actinic keratoses: Yes,  tx LN2  History of biopsies:Yes, Details: benign  History of blistering/severe sunburns:No  Family history of skin cancer:No  Family history of atypical moles:No      Allergies   Allergen Reactions    Other Misc Rash     Allergic to oil of palm        MEDICATIONS:  Medications relevant to specialty reviewed.     REVIEW OF SYSTEMS:   Positive for skin (see HPI)  Negative for fevers and chills       EXAM:  There were no vitals taken for this visit.  Constitutional: Well-developed, well-nourished, and in no distress.     A total body skin exam was performed excluding the genitals per patient preference and including the following areas: head (including face), neck, chest, abdomen, groin/buttocks, back, bilateral upper extremities, and bilateral lower extremities with the following pertinent findings listed below and/or in assessment/plan.     -sun exposed skin of trunk and b/l upper, lower extremities and face with scattered clinically benign light brown reticulated macules all of which were morphologically similar and none of which were suspicious for skin cancer today on exam    Several tan medium brown skin-colored stuck-on waxy papules scattered on the face, trunk and extremities    Multiple tan medium brown skin-colored macules papules scattered over the trunk >> extremities, All with benign-appearing pigment network patterns on dermoscopy    Several scattered 1-3mm bright red macules and thin papules on the trunk and extremities    Sebaceous hyperplasia to forehead    Milia to face    Traumatized acrochordon to R upper extremity    IMPRESSION / PLAN:    1. Lentigines  - Benign-appearing nature of lesions discussed during exam.   - Advised to continue to monitor for any return to clinic for new or  concerning changes.      2. Cherry angioma  - Benign-appearing nature of lesions discussed during exam.   - Advised to continue to monitor for any return to clinic for new or concerning changes.      3. SK (seborrheic keratosis)  - Benign-appearing nature of lesions discussed during exam.   - Advised to continue to monitor for any return to clinic for new or concerning changes.      4. Multiple nevi  - Benign-appearing nature of lesions discussed during exam.   - Advised to continue to monitor for any return to clinic for new or concerning changes.  - ABCDE's of melanoma discussed/handout given    5. Sebaceous hyperplasia  - Benign-appearing nature of lesions discussed during exam.   - Advised to continue to monitor for any return to clinic for new or concerning changes.      6. Acrochordon  - Benign-appearing nature of lesions discussed during exam.   - courtesy LN2 applied to tag on RUE  - Advised to continue to monitor for any return to clinic for new or concerning changes.      7. Skin cancer screening  Skin cancer education  discussed importance of sun protective clothing, eyewear in addition to the use of broad spectrum sunscreen with SPF 30 or greater, as well as need for reapplication ~every 2 hours when exposed to UVR/handout given  discussed importance following up for any new or changing lesions as noted in handout given, but every 12 months exams in clinic in the setting of dermatologic history  ABCDE's of melanoma discussed/handout given            I have performed a physical exam and reviewed and updated ROS and Plan today (6/27/2024). In review of dermatology visit (7/5/2023), there are no changes except as documented above.        Please note that this dictation was created using voice recognition software. I have made every reasonable attempt to correct obvious errors, but I expect that there are errors of grammar and possibly content that I did not discover before finalizing the note.      Return  to clinic in: Return in about 1 year (around 6/27/2025) for CAMRYN. and as needed for any new or changing skin lesions.

## 2024-11-22 ENCOUNTER — PHARMACY VISIT (OUTPATIENT)
Dept: PHARMACY | Facility: MEDICAL CENTER | Age: 64
End: 2024-11-22
Payer: COMMERCIAL

## 2024-11-22 PROCEDURE — RXMED WILLOW AMBULATORY MEDICATION CHARGE: Performed by: INTERNAL MEDICINE

## 2024-12-03 ENCOUNTER — HOSPITAL ENCOUNTER (OUTPATIENT)
Dept: LAB | Facility: MEDICAL CENTER | Age: 64
End: 2024-12-03
Attending: RADIOLOGY

## 2024-12-03 LAB — PSA SERPL-MCNC: 0.05 NG/ML (ref 0–4)

## 2024-12-03 PROCEDURE — 84153 ASSAY OF PSA TOTAL: CPT

## 2024-12-03 PROCEDURE — 36415 COLL VENOUS BLD VENIPUNCTURE: CPT

## 2025-05-08 ENCOUNTER — APPOINTMENT (OUTPATIENT)
Dept: DERMATOLOGY | Facility: IMAGING CENTER | Age: 65
End: 2025-05-08
Payer: MEDICARE

## 2025-05-08 DIAGNOSIS — L30.9 HAND DERMATITIS: ICD-10-CM

## 2025-05-08 DIAGNOSIS — L81.4 LENTIGINES: ICD-10-CM

## 2025-05-08 DIAGNOSIS — L82.0 INFLAMED SEBORRHEIC KERATOSIS: ICD-10-CM

## 2025-05-08 DIAGNOSIS — Z12.83 SKIN CANCER SCREENING: ICD-10-CM

## 2025-05-08 DIAGNOSIS — L82.1 SK (SEBORRHEIC KERATOSIS): ICD-10-CM

## 2025-05-08 DIAGNOSIS — D22.9 MULTIPLE NEVI: ICD-10-CM

## 2025-05-08 DIAGNOSIS — D18.01 CHERRY ANGIOMA: ICD-10-CM

## 2025-05-08 DIAGNOSIS — L73.8 SEBACEOUS HYPERPLASIA: ICD-10-CM

## 2025-05-08 PROCEDURE — 99213 OFFICE O/P EST LOW 20 MIN: CPT | Mod: 25 | Performed by: NURSE PRACTITIONER

## 2025-05-08 PROCEDURE — 17110 DESTRUCTION B9 LES UP TO 14: CPT | Performed by: NURSE PRACTITIONER

## 2025-05-08 RX ORDER — CLOBETASOL PROPIONATE 0.5 MG/G
OINTMENT TOPICAL
Qty: 30 G | Refills: 2 | Status: SHIPPED | OUTPATIENT
Start: 2025-05-08

## 2025-05-08 NOTE — PROGRESS NOTES
DERMATOLOGY NOTE  FOLLOW UP VISIT       Chief complaint: Follow-Up (CAMRYN)    HPI/location: Between toes on right foot  Time present: 1.5 months ago  Painful lesion: No  Itching lesion: No  Enlarging lesion: No        History of skin cancer: No  History of precancers/actinic keratoses: Yes,  tx LN2  History of biopsies:Yes, Details: benign  History of blistering/severe sunburns:No  Family history of skin cancer:No  Family history of atypical moles:No      Allergies   Allergen Reactions    Other Misc Rash     Allergic to oil of palm        MEDICATIONS:  Medications relevant to specialty reviewed.     REVIEW OF SYSTEMS:   Positive for skin (see HPI)  Negative for fevers and chills       EXAM:  There were no vitals taken for this visit.  Constitutional: Well-developed, well-nourished, and in no distress.     A total body skin exam was performed excluding the genitals per patient preference and including the following areas: head (including face), neck, chest, abdomen, groin/buttocks, back, bilateral upper extremities, and bilateral lower extremities with the following pertinent findings listed below and/or in assessment/plan.     -sun exposed skin of trunk and b/l upper, lower extremities and face with scattered clinically benign light brown reticulated macules all of which were morphologically similar and none of which were suspicious for skin cancer today on exam    Several tan medium brown skin-colored stuck-on waxy papules scattered on the face, trunk and extremities    Multiple tan medium brown skin-colored macules papules scattered over the trunk >> extremities, All with benign-appearing pigment network patterns on dermoscopy    Several scattered 1-3mm bright red macules and thin papules on the trunk and extremities    Sebaceous hyperplasia to forehead    Inflamed tan waxy stuck on appearing papule on dorsum of R foot just a base of 4th and 5th toe    IMPRESSION / PLAN:    1. Lentigines  - Benign-appearing nature  of lesions discussed during exam.   - Advised to continue to monitor for any return to clinic for new or concerning changes.      2. Cherry angioma  - Benign-appearing nature of lesions discussed during exam.   - Advised to continue to monitor for any return to clinic for new or concerning changes.      3. SK (seborrheic keratosis)  - Benign-appearing nature of lesions discussed during exam.   - Advised to continue to monitor for any return to clinic for new or concerning changes.      4. Multiple nevi  - Benign-appearing nature of lesions discussed during exam.   - Advised to continue to monitor for any return to clinic for new or concerning changes.  - ABCDE's of melanoma discussed/handout given    5. Sebaceous hyperplasia  - Benign-appearing nature of lesions discussed during exam.   - Advised to continue to monitor for any return to clinic for new or concerning changes.      6. Inflamed seborrheic keratosis  CRYOTHERAPY:  Risks (including, but not limited to: skin discoloration, redness, blister, blood blister, recurrence, need for further treatment, infection, scar) and benefits of cryotherapy discussed. Patient verbally agreed to proceed with treatment. 1 cryotherapy freeze thaw cycles of 10 seconds were applied to 1 lesion on R foot as noted on exam with cryac. Patient tolerated procedure well. Aftercare instructions given--no specific care needed unless irritated during healing process, can apply Vaseline with small band-aid if needed.      7. Hand dermatitis, likely contact irritant dermatitis  In setting of pt's reported hx  Advised use of emollients  Rx below  - clobetasol (TEMOVATE) 0.05 % Ointment; AAA twice a day, hands, for 2 weeks a time with 1-2 week break in between  Dispense: 30 g; Refill: 2        8. Skin cancer screening  Skin cancer education  discussed importance of sun protective clothing, eyewear in addition to the use of broad spectrum sunscreen with SPF 30 or greater, as well as need for  reapplication ~every 2 hours when exposed to UVR/handout given  discussed importance following up for any new or changing lesions as noted in handout given, but every 12 months exams in clinic in the setting of dermatologic history  ABCDE's of melanoma discussed/handout given            I have performed a physical exam and reviewed and updated ROS and Plan today (5/8/2025). In review of dermatology visit (6/27/2024), there are no changes except as documented above.        Please note that this dictation was created using voice recognition software. I have made every reasonable attempt to correct obvious errors, but I expect that there are errors of grammar and possibly content that I did not discover before finalizing the note.      Return to clinic in: Return in about 1 year (around 5/8/2026) for CAMRYN. and as needed for any new or changing skin lesions.

## 2025-07-11 SDOH — HEALTH STABILITY: PHYSICAL HEALTH: ON AVERAGE, HOW MANY MINUTES DO YOU ENGAGE IN EXERCISE AT THIS LEVEL?: 30 MIN

## 2025-07-11 SDOH — ECONOMIC STABILITY: FOOD INSECURITY: WITHIN THE PAST 12 MONTHS, THE FOOD YOU BOUGHT JUST DIDN'T LAST AND YOU DIDN'T HAVE MONEY TO GET MORE.: NEVER TRUE

## 2025-07-11 SDOH — HEALTH STABILITY: PHYSICAL HEALTH: ON AVERAGE, HOW MANY DAYS PER WEEK DO YOU ENGAGE IN MODERATE TO STRENUOUS EXERCISE (LIKE A BRISK WALK)?: 5 DAYS

## 2025-07-11 SDOH — ECONOMIC STABILITY: TRANSPORTATION INSECURITY
IN THE PAST 12 MONTHS, HAS THE LACK OF TRANSPORTATION KEPT YOU FROM MEDICAL APPOINTMENTS OR FROM GETTING MEDICATIONS?: PATIENT DECLINED

## 2025-07-11 SDOH — ECONOMIC STABILITY: INCOME INSECURITY: HOW HARD IS IT FOR YOU TO PAY FOR THE VERY BASICS LIKE FOOD, HOUSING, MEDICAL CARE, AND HEATING?: PATIENT DECLINED

## 2025-07-11 SDOH — ECONOMIC STABILITY: FOOD INSECURITY: WITHIN THE PAST 12 MONTHS, YOU WORRIED THAT YOUR FOOD WOULD RUN OUT BEFORE YOU GOT MONEY TO BUY MORE.: NEVER TRUE

## 2025-07-11 SDOH — ECONOMIC STABILITY: INCOME INSECURITY: IN THE LAST 12 MONTHS, WAS THERE A TIME WHEN YOU WERE NOT ABLE TO PAY THE MORTGAGE OR RENT ON TIME?: PATIENT DECLINED

## 2025-07-11 SDOH — ECONOMIC STABILITY: HOUSING INSECURITY
IN THE LAST 12 MONTHS, WAS THERE A TIME WHEN YOU DID NOT HAVE A STEADY PLACE TO SLEEP OR SLEPT IN A SHELTER (INCLUDING NOW)?: PATIENT DECLINED

## 2025-07-11 ASSESSMENT — LIFESTYLE VARIABLES
HOW MANY STANDARD DRINKS CONTAINING ALCOHOL DO YOU HAVE ON A TYPICAL DAY: PATIENT DOES NOT DRINK
HOW OFTEN DO YOU HAVE SIX OR MORE DRINKS ON ONE OCCASION: NEVER
HOW OFTEN DO YOU HAVE A DRINK CONTAINING ALCOHOL: MONTHLY OR LESS
SKIP TO QUESTIONS 9-10: 1
AUDIT-C TOTAL SCORE: 1

## 2025-07-11 ASSESSMENT — SOCIAL DETERMINANTS OF HEALTH (SDOH)
IN A TYPICAL WEEK, HOW MANY TIMES DO YOU TALK ON THE PHONE WITH FAMILY, FRIENDS, OR NEIGHBORS?: MORE THAN THREE TIMES A WEEK
HOW OFTEN DO YOU ATTENT MEETINGS OF THE CLUB OR ORGANIZATION YOU BELONG TO?: PATIENT DECLINED
HOW OFTEN DO YOU ATTEND CHURCH OR RELIGIOUS SERVICES?: NEVER
WITHIN THE PAST 12 MONTHS, YOU WORRIED THAT YOUR FOOD WOULD RUN OUT BEFORE YOU GOT THE MONEY TO BUY MORE: NEVER TRUE
HOW MANY DRINKS CONTAINING ALCOHOL DO YOU HAVE ON A TYPICAL DAY WHEN YOU ARE DRINKING: PATIENT DOES NOT DRINK
IN A TYPICAL WEEK, HOW MANY TIMES DO YOU TALK ON THE PHONE WITH FAMILY, FRIENDS, OR NEIGHBORS?: MORE THAN THREE TIMES A WEEK
HOW OFTEN DO YOU HAVE A DRINK CONTAINING ALCOHOL: MONTHLY OR LESS
HOW OFTEN DO YOU ATTEND CHURCH OR RELIGIOUS SERVICES?: NEVER
DO YOU BELONG TO ANY CLUBS OR ORGANIZATIONS SUCH AS CHURCH GROUPS UNIONS, FRATERNAL OR ATHLETIC GROUPS, OR SCHOOL GROUPS?: PATIENT DECLINED
IN THE PAST 12 MONTHS, HAS THE ELECTRIC, GAS, OIL, OR WATER COMPANY THREATENED TO SHUT OFF SERVICE IN YOUR HOME?: PATIENT DECLINED
DO YOU BELONG TO ANY CLUBS OR ORGANIZATIONS SUCH AS CHURCH GROUPS UNIONS, FRATERNAL OR ATHLETIC GROUPS, OR SCHOOL GROUPS?: PATIENT DECLINED
HOW OFTEN DO YOU HAVE SIX OR MORE DRINKS ON ONE OCCASION: NEVER
HOW HARD IS IT FOR YOU TO PAY FOR THE VERY BASICS LIKE FOOD, HOUSING, MEDICAL CARE, AND HEATING?: PATIENT DECLINED
HOW OFTEN DO YOU ATTENT MEETINGS OF THE CLUB OR ORGANIZATION YOU BELONG TO?: PATIENT DECLINED
HOW OFTEN DO YOU GET TOGETHER WITH FRIENDS OR RELATIVES?: THREE TIMES A WEEK
HOW OFTEN DO YOU GET TOGETHER WITH FRIENDS OR RELATIVES?: THREE TIMES A WEEK

## 2025-07-14 ENCOUNTER — APPOINTMENT (OUTPATIENT)
Dept: MEDICAL GROUP | Facility: IMAGING CENTER | Age: 65
End: 2025-07-14
Payer: MEDICARE

## 2025-07-14 VITALS
HEART RATE: 53 BPM | DIASTOLIC BLOOD PRESSURE: 60 MMHG | HEIGHT: 69 IN | WEIGHT: 213.8 LBS | OXYGEN SATURATION: 95 % | SYSTOLIC BLOOD PRESSURE: 124 MMHG | TEMPERATURE: 98.3 F | BODY MASS INDEX: 31.67 KG/M2 | RESPIRATION RATE: 16 BRPM

## 2025-07-14 DIAGNOSIS — L30.9 HAND DERMATITIS: ICD-10-CM

## 2025-07-14 DIAGNOSIS — Z76.0 MEDICATION REFILL: ICD-10-CM

## 2025-07-14 DIAGNOSIS — E66.811 CLASS 1 OBESITY WITH BODY MASS INDEX (BMI) OF 31.0 TO 31.9 IN ADULT, UNSPECIFIED OBESITY TYPE, UNSPECIFIED WHETHER SERIOUS COMORBIDITY PRESENT: ICD-10-CM

## 2025-07-14 DIAGNOSIS — E78.00 ELEVATED LDL CHOLESTEROL LEVEL: ICD-10-CM

## 2025-07-14 DIAGNOSIS — Z23 NEED FOR VACCINATION: ICD-10-CM

## 2025-07-14 DIAGNOSIS — R73.03 PREDIABETES: ICD-10-CM

## 2025-07-14 DIAGNOSIS — Z00.00 ENCOUNTER FOR MEDICARE ANNUAL WELLNESS EXAM: Primary | ICD-10-CM

## 2025-07-14 DIAGNOSIS — L30.9 ECZEMA, UNSPECIFIED TYPE: ICD-10-CM

## 2025-07-14 DIAGNOSIS — E55.9 VITAMIN D DEFICIENCY: ICD-10-CM

## 2025-07-14 PROCEDURE — G0402 INITIAL PREVENTIVE EXAM: HCPCS | Mod: 25

## 2025-07-14 PROCEDURE — 90677 PCV20 VACCINE IM: CPT

## 2025-07-14 PROCEDURE — 3078F DIAST BP <80 MM HG: CPT

## 2025-07-14 PROCEDURE — G0009 ADMIN PNEUMOCOCCAL VACCINE: HCPCS

## 2025-07-14 PROCEDURE — 3074F SYST BP LT 130 MM HG: CPT

## 2025-07-14 PROCEDURE — 1126F AMNT PAIN NOTED NONE PRSNT: CPT

## 2025-07-14 RX ORDER — HYDROCORTISONE 25 MG/G
OINTMENT TOPICAL 2 TIMES DAILY
Status: CANCELLED | OUTPATIENT
Start: 2025-07-14

## 2025-07-14 RX ORDER — HYDROCORTISONE 25 MG/G
OINTMENT TOPICAL 2 TIMES DAILY
COMMUNITY
End: 2025-07-14 | Stop reason: SDUPTHER

## 2025-07-14 RX ORDER — TRIAMCINOLONE ACETONIDE 1 MG/G
1 CREAM TOPICAL 2 TIMES DAILY
Qty: 30 G | Refills: 6 | Status: SHIPPED | OUTPATIENT
Start: 2025-07-14

## 2025-07-14 RX ORDER — HYDROCORTISONE 25 MG/G
1 OINTMENT TOPICAL 2 TIMES DAILY
Qty: 30 G | Refills: 2 | Status: SHIPPED | OUTPATIENT
Start: 2025-07-14

## 2025-07-14 ASSESSMENT — ENCOUNTER SYMPTOMS: GENERAL WELL-BEING: GOOD

## 2025-07-14 ASSESSMENT — PATIENT HEALTH QUESTIONNAIRE - PHQ9: CLINICAL INTERPRETATION OF PHQ2 SCORE: 0

## 2025-07-14 ASSESSMENT — PAIN SCALES - GENERAL: PAINLEVEL_OUTOF10: NO PAIN

## 2025-07-14 ASSESSMENT — ACTIVITIES OF DAILY LIVING (ADL): BATHING_REQUIRES_ASSISTANCE: 0

## 2025-07-14 ASSESSMENT — FIBROSIS 4 INDEX: FIB4 SCORE: 1.19

## 2025-07-14 NOTE — PROGRESS NOTES
Chief Complaint   Patient presents with    Medicare Annual Wellness       HPI:  Dipak Johnson is a 64 y.o. here for Medicare Annual Wellness Visit     Patient Active Problem List    Diagnosis Date Noted    Lumbar stenosis with neurogenic claudication 12/11/2023    At increased risk for cardiovascular disease 08/15/2022    Post-procedural erectile dysfunction 03/23/2021    Inflamed seborrheic keratosis 08/02/2019    Skin tag 08/02/2019    History of malignant neoplasm of prostate 09/21/2016    Family history of malignant neoplasm of prostate 03/05/2013    Epididymitis 02/24/2011    Dermatitis 06/12/2009    Hx of appendectomy 06/12/2009    Obesity, unspecified 06/12/2009    Hx of hernia repair 04/17/2007    Allergic rhinitis 03/07/1997       Current Medications[1]       Current supplements as per medication list.     Allergies: Other misc    Current social contact/activities: hunting, fishing, hiking, golf     He  reports that he has never smoked. He has never used smokeless tobacco. He reports that he does not currently use alcohol. He reports that he does not currently use drugs.  Counseling given: Not Answered      ROS:    Gait: Uses no assistive device  Ostomy: No  Other tubes: No  Amputations: No  Chronic oxygen use: No  Last eye exam: 3 months ago. 2025  Wears hearing aids: No   : Denies any urinary leakage during the last 6 months    Screening:    Depression Screening  Little interest or pleasure in doing things?  0 - not at all  Feeling down, depressed , or hopeless? 0 - not at all  Patient Health Questionnaire Score: 0     If depressive symptoms identified deferred to follow up visit unless specifically addressed in assessment and plan.    Interpretation of PHQ-9 Total Score   Score Severity   1-4 No Depression   5-9 Mild Depression   10-14 Moderate Depression   15-19 Moderately Severe Depression   20-27 Severe Depression    Screening for Cognitive Impairment  Do you or any of your friends or family  members have any concern about your memory? No  Three Minute Recall (Village, Kitchen, Baby) 3/3    Tejas clock face with all 12 numbers and set the hands to show 10 minutes past 11.  Yes    Cognitive concerns identified deferred for follow up unless specifically addressed in assessment and plan.    Fall Risk Assessment  Has the patient had two or more falls in the last year or any fall with injury in the last year?  No    Safety Assessment  Do you always wear your seatbelt?  Yes  Any changes to home needed to function safely? No  Difficulty hearing.  No  Patient counseled about all safety risks that were identified.    Functional Assessment ADLs  Are there any barriers preventing you from cooking for yourself or meeting nutritional needs?  No.    Are there any barriers preventing you from driving safely or obtaining transportation?  No.    Are there any barriers preventing you from using a telephone or calling for help?  No    Are there any barriers preventing you from shopping?  No.    Are there any barriers preventing you from taking care of your own finances?  No    Are there any barriers preventing you from managing your medications?  No    Are there any barriers preventing you from showering, bathing or dressing yourself? No    Are there any barriers preventing you from doing housework or laundry? No  Are there any barriers preventing you from using the toilet?No  Are you currently engaging in any exercise or physical activity?  Yes.      Self-Assessment of Health  What is your perception of your health? Good    Do you sleep more than six hours a night? Yes    In the past 7 days, how much did pain keep you from doing your normal work? None    Do you spend quality time with family or friends (virtually or in person)? Yes    Do you usually eat a heart healthy diet that constists of a variety of fruits, vegetables, whole grains and fiber? Yes    Do you eat foods high in fat and/or Fast Food more than three times  per week? No    How concerned are you that your medical conditions are not being well managed? Not at all    Are you worried that in the next 2 months, you may not have stable housing that you own, rent, or stay in as part of a household? No      Advance Care Planning  Do you have an Advance Directive, Living Will, Durable Power of , or POLST? Yes    Living Will     is not on file - instructed patient to bring in a copy to scan into their chart      Health Maintenance Summary            Current Care Gaps       Zoster (Shingles) Vaccines (2 of 2) Overdue since 12/2/2021      10/07/2021  Imm Admin: Zoster Vaccine Recombinant (RZV) (SHINGRIX)              COVID-19 Vaccine (4 - 2024-25 season) Overdue since 9/1/2024 12/07/2021  Imm Admin: MODERNA SARS-COV-2 VACCINE (12+)    04/17/2021  Imm Admin: MODERNA SARS-COV-2 VACCINE (12+)    03/19/2021  Imm Admin: MODERNA SARS-COV-2 VACCINE (12+)              Annual Wellness Visit (Yearly) Never done     No completion history exists for this topic.              Colorectal Cancer Screening (View Topic Details) Never done     No completion history exists for this topic.              Pneumococcal Vaccine: 50+ Years (1 of 1 - PCV) Never done     No completion history exists for this topic.                      Needs Review       Hepatitis C Screening  Tentatively Complete      04/09/2021  Hepatitis C Antibody component of HEP C VIRUS ANTIBODY                      Upcoming       Influenza Vaccine (1) Next due on 9/1/2025      10/26/2023  Outside Immunization: Influenza Quad Inj P    11/07/2022  Outside Immunization: Influenza Quad Inj P    11/22/2021  Imm Admin: Influenza Vaccine Quad Inj (Pf)    11/09/2020  Imm Admin: Influenza Vaccine Quad Inj (Pf)    10/01/2019  Imm Admin: Influenza Vaccine Quad Recombinant     Only the first 5 history entries have been loaded, but more history exists.            IMM DTaP/Tdap/Td Vaccine (4 - Td or Tdap) Next due on 2/16/2033       "02/16/2023  Outside Immunization: Tdap    02/24/2011  Imm Admin: Tdap Vaccine    02/20/1997  Imm Admin: Tdap Vaccine                      Completed or No Longer Recommended       Hepatitis A Vaccine (Hep A) (Series Information) Aged Out      No completion history exists for this topic.              Hepatitis B Vaccine (Hep B) (Series Information) Aged Out     No completion history exists for this topic.              HPV Vaccines (Series Information) Aged Out     No completion history exists for this topic.              Polio Vaccine (Inactivated Polio) (Series Information) Aged Out     No completion history exists for this topic.              Meningococcal Immunization (Series Information) Aged Out     No completion history exists for this topic.              Meningococcal B Vaccine (Series Information) Aged Out     No completion history exists for this topic.                            Patient Care Team:  MAGALY Orellana as PCP - General (Nurse Practitioner Family)        Social History[2]  History reviewed. No pertinent family history.  He  has a past medical history of Anesthesia, Cancer (HCC) (2012), PONV (postoperative nausea and vomiting), and Prostate cancer (HCC).   Past Surgical History[3]    Exam:   /60 (BP Location: Right arm, Patient Position: Sitting, BP Cuff Size: Adult long)   Pulse (!) 53   Temp 36.8 °C (98.3 °F) (Temporal)   Resp 16   Ht 1.753 m (5' 9\")   Wt 97 kg (213 lb 12.8 oz)   SpO2 95%  Body mass index is 31.57 kg/m².    Hearing fair  Dentition good  Alert, oriented in no acute distress.  Eye contact is good, speech goal directed, affect calm    Assessment and Plan. The following treatment and monitoring plan is recommended:      1. Encounter for Medicare annual wellness exam (Primary)  PMH/PSH/FH/Social history reviewed. Medication reconciled. Vaccinations discussed. Previous records and labs reviewed. Discussed age appropriate anticipatory guidance.    2. Class 1 obesity " with body mass index (BMI) of 31.0 to 31.9 in adult, unspecified obesity type, unspecified whether serious comorbidity present  Discussed lifestyle and dietary changes such as low-carb diet, high in vegetables and fresh fruits.  Encouraged to increase water intake.  Regular physical exercise at least 30 minutes/day 5 days a week. Weight reduction if applicable (aim for 5% to 10% body weight increments). Patient is at an increased risk for serious conditions such as heart disease, type 2 diabetes, YORDAN, certain types of cancers, gallbladder disease, and circulation problems. Will order labs to rule out.     - CBC WITH DIFFERENTIAL; Future  - Comp Metabolic Panel; Future  - Lipid Profile; Future  - TSH WITH REFLEX TO FT4; Future  - VITAMIN D,25 HYDROXY (DEFICIENCY); Future  - HEMOGLOBIN A1C; Future    3. Prediabetes  Established condition. Pt working on lifestyle modification. Will repeat Labs.    - Comp Metabolic Panel; Future  - HEMOGLOBIN A1C; Future    4. Elevated LDL cholesterol level  Chronic condition. Not on statins. Calcium Ct score 0 one year ago. The 10-year ASCVD risk score (Jose Alfredo DK, et al., 2019) is: 11.9%  Patient is working on lifestyle modification.  Will repeat Labs.    - Comp Metabolic Panel; Future  - Lipid Profile; Future    5. Vitamin D deficiency  Established condition.  Patient completed vitamin d prescription tx one year ago.  will repeat labs.  - VITAMIN D,25 HYDROXY (DEFICIENCY); Future    6. Medication refill  - triamcinolone acetonide (KENALOG) 0.1 % Cream; Apply 1 Application topically 2 times a day.  Dispense: 30 g; Refill: 6  - hydrocortisone 2.5 % Ointment; Apply 1 Application topically 2 times a day.  Dispense: 30 g; Refill: 2    7. Eczema, unspecified type  Chronic, stable on kenalog cream with eczema flare up.   Med refill sent to pharmacy. Do not apply past 14 days consecutively.     - triamcinolone acetonide (KENALOG) 0.1 % Cream; Apply 1 Application topically 2 times a day.   Dispense: 30 g; Refill: 6    8. Hand dermatitis  Chronic, stable on hydrocortisone ointment w/exposure to fishing oil and equipment. Med refill sent to pharmacy. Do not apply past 14 days consecutively.     - hydrocortisone 2.5 % Ointment; Apply 1 Application topically 2 times a day.  Dispense: 30 g; Refill: 2    9. Need for vaccination    - Pneumococcal Conjugate Vaccine 20-Valent (6 wks+)      Services suggested: No services needed at this time  Health Care Screening: Age-appropriate preventive services recommended by USPTF and ACIP covered by Medicare were discussed today. Services ordered if indicated and agreed upon by the patient.  Referrals offered: Community-based lifestyle interventions to reduce health risks and promote self-management and wellness, fall prevention, nutrition, physical activity, tobacco-use cessation, weight loss, and mental health services as per orders if indicated.    Discussion today about general wellness and lifestyle habits:    Prevent falls and reduce trip hazards; Cautioned about securing or removing rugs.  Have a working fire alarm and carbon monoxide detector;   Engage in regular physical activity and social activities     Follow-up: No follow-ups on file.          [1]   Current Outpatient Medications   Medication Sig Dispense Refill    hydrocortisone 2.5 % Ointment Apply  topically 2 times a day.      clobetasol (TEMOVATE) 0.05 % Ointment AAA twice a day, hands, for 2 weeks a time with 1-2 week break in between 30 g 2    triamcinolone acetonide (KENALOG) 0.1 % Cream Apply 1 Application topically 2 times a day. 30 g 6    influenza vaccine (FLUZONE) 0.5 ML Suspension Prefilled Syringe injection Inject 0.5 mL into the shoulder, thigh, or buttocks. (Patient not taking: Reported on 7/14/2025) 0.5 mL 0    Cholecalciferol (VITAMIN D3) 1.25 MG (20184 UT) Cap Take 1 Capsule by mouth every 7 days. (Patient not taking: Reported on 7/14/2025) 12 Capsule 1     No current  facility-administered medications for this visit.   [2]   Social History  Tobacco Use    Smoking status: Never    Smokeless tobacco: Never   Vaping Use    Vaping status: Never Used   Substance Use Topics    Alcohol use: Not Currently    Drug use: Not Currently   [3]   Past Surgical History:  Procedure Laterality Date    LUMBAR LAMINECTOMY DISKECTOMY Right 12/11/2023    Procedure: POSTERIOR LUMBAR BILATERAL L3 AND L4 LAMINOTOMIES; L3-4  MICRODISCECTOMY AND L4-L5 DECOMPRESSION;  Surgeon: Lucas Jarquin M.D.;  Location: SURGERY [facility];  Service: Neurosurgery    MT HERNIA REPAIR VENTRAL ANY APPROACH < 3CM REDUCIBLE N/A 6/16/2023    Procedure: ROBOTIC TRANSABDOMINAL PRE PERITONEAL INCISIONAL HERNIA REPAIR;  Surgeon: Abiel Sanz M.D.;  Location: SURGERY [facility];  Service: Gen Robotic    PROSTATECTOMY, RADICAL RETRO  2012    APPENDECTOMY      CARPAL TUNNEL RELEASE Left     HERNIA REPAIR      VASECTOMY

## 2025-07-29 ENCOUNTER — HOSPITAL ENCOUNTER (OUTPATIENT)
Facility: MEDICAL CENTER | Age: 65
End: 2025-07-29
Payer: MEDICARE

## 2025-07-29 ENCOUNTER — HOSPITAL ENCOUNTER (OUTPATIENT)
Facility: MEDICAL CENTER | Age: 65
End: 2025-07-29
Attending: RADIOLOGY
Payer: MEDICARE

## 2025-07-29 DIAGNOSIS — E78.00 ELEVATED LDL CHOLESTEROL LEVEL: ICD-10-CM

## 2025-07-29 DIAGNOSIS — E66.811 CLASS 1 OBESITY WITH BODY MASS INDEX (BMI) OF 31.0 TO 31.9 IN ADULT, UNSPECIFIED OBESITY TYPE, UNSPECIFIED WHETHER SERIOUS COMORBIDITY PRESENT: ICD-10-CM

## 2025-07-29 DIAGNOSIS — R73.03 PREDIABETES: ICD-10-CM

## 2025-07-29 DIAGNOSIS — E55.9 VITAMIN D DEFICIENCY: ICD-10-CM

## 2025-07-29 LAB
25(OH)D3 SERPL-MCNC: 25 NG/ML (ref 30–100)
ALBUMIN SERPL BCP-MCNC: 4 G/DL (ref 3.2–4.9)
ALBUMIN/GLOB SERPL: 1.5 G/DL
ALP SERPL-CCNC: 81 U/L (ref 30–99)
ALT SERPL-CCNC: 24 U/L (ref 2–50)
ANION GAP SERPL CALC-SCNC: 11 MMOL/L (ref 7–16)
AST SERPL-CCNC: 25 U/L (ref 12–45)
BASOPHILS # BLD AUTO: 0.8 % (ref 0–1.8)
BASOPHILS # BLD: 0.03 K/UL (ref 0–0.12)
BILIRUB SERPL-MCNC: 0.7 MG/DL (ref 0.1–1.5)
BUN SERPL-MCNC: 19 MG/DL (ref 8–22)
CALCIUM ALBUM COR SERPL-MCNC: 9 MG/DL (ref 8.5–10.5)
CALCIUM SERPL-MCNC: 9 MG/DL (ref 8.5–10.5)
CHLORIDE SERPL-SCNC: 106 MMOL/L (ref 96–112)
CHOLEST SERPL-MCNC: 158 MG/DL (ref 100–199)
CO2 SERPL-SCNC: 23 MMOL/L (ref 20–33)
CREAT SERPL-MCNC: 0.94 MG/DL (ref 0.5–1.4)
EOSINOPHIL # BLD AUTO: 0.07 K/UL (ref 0–0.51)
EOSINOPHIL NFR BLD: 1.8 % (ref 0–6.9)
ERYTHROCYTE [DISTWIDTH] IN BLOOD BY AUTOMATED COUNT: 47.4 FL (ref 35.9–50)
EST. AVERAGE GLUCOSE BLD GHB EST-MCNC: 117 MG/DL
FASTING STATUS PATIENT QL REPORTED: NORMAL
GFR SERPLBLD CREATININE-BSD FMLA CKD-EPI: 89 ML/MIN/1.73 M 2
GLOBULIN SER CALC-MCNC: 2.6 G/DL (ref 1.9–3.5)
GLUCOSE SERPL-MCNC: 101 MG/DL (ref 65–99)
HBA1C MFR BLD: 5.7 % (ref 4–5.6)
HCT VFR BLD AUTO: 44.7 % (ref 42–52)
HDLC SERPL-MCNC: 40 MG/DL
HGB BLD-MCNC: 15.2 G/DL (ref 14–18)
IMM GRANULOCYTES # BLD AUTO: 0.01 K/UL (ref 0–0.11)
IMM GRANULOCYTES NFR BLD AUTO: 0.3 % (ref 0–0.9)
LDLC SERPL CALC-MCNC: 99 MG/DL
LYMPHOCYTES # BLD AUTO: 1.75 K/UL (ref 1–4.8)
LYMPHOCYTES NFR BLD: 45.3 % (ref 22–41)
MCH RBC QN AUTO: 32.9 PG (ref 27–33)
MCHC RBC AUTO-ENTMCNC: 34 G/DL (ref 32.3–36.5)
MCV RBC AUTO: 96.8 FL (ref 81.4–97.8)
MONOCYTES # BLD AUTO: 0.49 K/UL (ref 0–0.85)
MONOCYTES NFR BLD AUTO: 12.7 % (ref 0–13.4)
NEUTROPHILS # BLD AUTO: 1.51 K/UL (ref 1.82–7.42)
NEUTROPHILS NFR BLD: 39.1 % (ref 44–72)
NRBC # BLD AUTO: 0 K/UL
NRBC BLD-RTO: 0 /100 WBC (ref 0–0.2)
PLATELET # BLD AUTO: 280 K/UL (ref 164–446)
PMV BLD AUTO: 10.2 FL (ref 9–12.9)
POTASSIUM SERPL-SCNC: 4 MMOL/L (ref 3.6–5.5)
PROT SERPL-MCNC: 6.6 G/DL (ref 6–8.2)
PSA SERPL-MCNC: 0.06 NG/ML (ref 0–4)
RBC # BLD AUTO: 4.62 M/UL (ref 4.7–6.1)
SODIUM SERPL-SCNC: 140 MMOL/L (ref 135–145)
TRIGL SERPL-MCNC: 96 MG/DL (ref 0–149)
TSH SERPL DL<=0.005 MIU/L-ACNC: 2.18 UIU/ML (ref 0.38–5.33)
WBC # BLD AUTO: 3.9 K/UL (ref 4.8–10.8)

## 2025-07-29 PROCEDURE — 84443 ASSAY THYROID STIM HORMONE: CPT

## 2025-07-29 PROCEDURE — 85025 COMPLETE CBC W/AUTO DIFF WBC: CPT

## 2025-07-29 PROCEDURE — 82306 VITAMIN D 25 HYDROXY: CPT

## 2025-07-29 PROCEDURE — 36415 COLL VENOUS BLD VENIPUNCTURE: CPT

## 2025-07-29 PROCEDURE — 80061 LIPID PANEL: CPT

## 2025-07-29 PROCEDURE — 80053 COMPREHEN METABOLIC PANEL: CPT

## 2025-07-29 PROCEDURE — 83036 HEMOGLOBIN GLYCOSYLATED A1C: CPT | Mod: GA

## 2025-07-29 PROCEDURE — 84153 ASSAY OF PSA TOTAL: CPT

## 2025-07-30 ENCOUNTER — RESULTS FOLLOW-UP (OUTPATIENT)
Dept: MEDICAL GROUP | Facility: IMAGING CENTER | Age: 65
End: 2025-07-30
Payer: MEDICARE

## (undated) DEVICE — OBTURATOR BLADELESS STANDARD 8MM (6EA/BX)

## (undated) DEVICE — GLOVE BIOGEL INDICATOR SZ 8.5 SURGICAL PF LTX - (50/BX 4BX/CA)

## (undated) DEVICE — SYSTEM CLEARIFY VISUALIZATION (10EA/PK)

## (undated) DEVICE — DRAPE ARM  BOX OF 20

## (undated) DEVICE — DRAPE LAPAROTOMY T SHEET - (12EA/CA)

## (undated) DEVICE — SUTURE 4-0 MONOCRYL PLUS PS-2 - 27 INCH (36/BX)

## (undated) DEVICE — GLOVE BIOGEL INDICATOR SZ 7.5 SURGICAL PF LTX - (50PR/BX 4BX/CA)

## (undated) DEVICE — COVER TIP ENDOWRIST HOT SHEAR - (10EA/BX) DA VINCI

## (undated) DEVICE — SUTURE 0 VICRYL PLUS UR-6 - 27 INCH (36/BX)

## (undated) DEVICE — NEEDLE INSFL 120MM 14GA VRRS - (20/BX)

## (undated) DEVICE — PACK NEURO - (2EA/CA)

## (undated) DEVICE — TUBE CONNECTING SUCTION - CLEAR PLASTIC STERILE 72 IN (50EA/CA)

## (undated) DEVICE — TUBING FILTER STRYKER

## (undated) DEVICE — SUTURE 2-0 VICRYL PLUS SH - 27 INCH (36/BX)

## (undated) DEVICE — SHEARS MONOPOLAR CURVED  DA VINCI 10X'S REUSABLE

## (undated) DEVICE — SUTURE 3-0 ETHILON FS-1 - (36/BX) 30 INCH

## (undated) DEVICE — SUTURE 2-0 VICRYL PLUS CT-2 - 27 INCH (36/BX)

## (undated) DEVICE — SUTURE 4-0 MONOCRYL PLUS PS-1 - 27 INCH (36/BX)

## (undated) DEVICE — SUTURE 3-0 MONOCRYL PLUS PS-1 - 27 INCH (36/BX)

## (undated) DEVICE — GLOVE BIOGEL SZ 7.5 SURGICAL PF LTX - (50PR/BX 4BX/CA)

## (undated) DEVICE — DRESSING TRANSPARENT FILM TEGADERM 4 X 4.75" (50EA/BX)"

## (undated) DEVICE — GLOVE BIOGEL SZ 8 SURGICAL PF LTX - (50PR/BX 4BX/CA)

## (undated) DEVICE — CHLORAPREP 26 ML APPLICATOR - ORANGE TINT(25/CA)

## (undated) DEVICE — SEAL 5MM-8MM UNIVERSAL  BOX OF 10

## (undated) DEVICE — TOWEL STOP TIMEOUT SAFETY FLAG (40EA/CA)

## (undated) DEVICE — ROBOTIC SURGERY SERVICES

## (undated) DEVICE — DRAPE COLUMN  BOX OF 20

## (undated) DEVICE — BIPOLAR FORCE DA VINCI 12X'S REISABLE

## (undated) DEVICE — DERMABOND ADVANCED - (12EA/BX)

## (undated) DEVICE — COVER LIGHT HANDLE ALC PLUS DISP (18EA/BX)

## (undated) DEVICE — Device

## (undated) DEVICE — SUTURE 2-0 STRATAFIX SPIRAL PDS SH (12EA/BX)

## (undated) DEVICE — SURGIFOAM (SIZE 100) - (6EA/CA)

## (undated) DEVICE — TOWELS CLOTH SURGICAL - (4/PK 20PK/CA)

## (undated) DEVICE — CORDS BIPOLAR COAGULATION - 12FT STERILE DISP. (10EA/BX)

## (undated) DEVICE — SYRINGE 30 ML LS (56/BX)

## (undated) DEVICE — SPONGE GAUZESTER 4 X 4 4PLY - (128PK/CA)

## (undated) DEVICE — COVER MAYO STAND X-LG - (22EA/CA)

## (undated) DEVICE — SUCTION INSTRUMENT YANKAUER BULBOUS TIP W/O VENT (50EA/CA)

## (undated) DEVICE — GLOVE BIOGEL SZ 8.5 SURGICAL PF LTX - (50PR/BX 4BX/CA)

## (undated) DEVICE — SODIUM CHL IRRIGATION 0.9% 1000ML (12EA/CA)

## (undated) DEVICE — SUTURE 0 STRATRFIX SYMMETRIC PDS PLUS 30CM CT-1 (12EA/BX)

## (undated) DEVICE — PACK UNIVERSAL SURGICAL ECLIPSE 1 (5EA/CA)

## (undated) DEVICE — NEEDLE DRIVER MEGA SUTURECUT DA VINCI 15X'S REUSABLE

## (undated) DEVICE — PEN SKIN MARKER W/RULER - (50EA/BX)

## (undated) DEVICE — GLOVE BIOGEL PI ULTRATOUCH SZ 8.0 SURGICAL PF LF - (50/BX 4BX/CA)

## (undated) DEVICE — SUTURE GENERAL

## (undated) DEVICE — GLOVE SZ 7 BIOGEL PI MICRO - PF LF (50PR/BX 4BX/CA)

## (undated) DEVICE — GLOVE BIOGEL PI INDICATOR SZ 7.5 SURGICAL PF LF -(50/BX 4BX/CA)

## (undated) DEVICE — DRAPE IOBAN II INCISE 23X17 - (10EA/BX 4BX/CA)